# Patient Record
Sex: FEMALE | Race: OTHER | HISPANIC OR LATINO
[De-identification: names, ages, dates, MRNs, and addresses within clinical notes are randomized per-mention and may not be internally consistent; named-entity substitution may affect disease eponyms.]

---

## 2020-08-24 ENCOUNTER — APPOINTMENT (OUTPATIENT)
Dept: FAMILY MEDICINE | Facility: CLINIC | Age: 45
End: 2020-08-24
Payer: MEDICARE

## 2020-08-24 ENCOUNTER — NON-APPOINTMENT (OUTPATIENT)
Age: 45
End: 2020-08-24

## 2020-08-24 VITALS
HEART RATE: 81 BPM | BODY MASS INDEX: 30.75 KG/M2 | TEMPERATURE: 97.9 F | HEIGHT: 65 IN | DIASTOLIC BLOOD PRESSURE: 72 MMHG | WEIGHT: 184.6 LBS | SYSTOLIC BLOOD PRESSURE: 122 MMHG | OXYGEN SATURATION: 97 %

## 2020-08-24 VITALS — RESPIRATION RATE: 12 BRPM

## 2020-08-24 DIAGNOSIS — Z87.898 PERSONAL HISTORY OF OTHER SPECIFIED CONDITIONS: ICD-10-CM

## 2020-08-24 DIAGNOSIS — R94.31 ABNORMAL ELECTROCARDIOGRAM [ECG] [EKG]: ICD-10-CM

## 2020-08-24 DIAGNOSIS — Z86.59 PERSONAL HISTORY OF OTHER MENTAL AND BEHAVIORAL DISORDERS: ICD-10-CM

## 2020-08-24 DIAGNOSIS — F90.9 ATTENTION-DEFICIT HYPERACTIVITY DISORDER, UNSPECIFIED TYPE: ICD-10-CM

## 2020-08-24 DIAGNOSIS — F17.200 NICOTINE DEPENDENCE, UNSPECIFIED, UNCOMPLICATED: ICD-10-CM

## 2020-08-24 DIAGNOSIS — R01.1 CARDIAC MURMUR, UNSPECIFIED: ICD-10-CM

## 2020-08-24 DIAGNOSIS — Z80.1 FAMILY HISTORY OF MALIGNANT NEOPLASM OF TRACHEA, BRONCHUS AND LUNG: ICD-10-CM

## 2020-08-24 PROCEDURE — 36415 COLL VENOUS BLD VENIPUNCTURE: CPT

## 2020-08-24 PROCEDURE — 93000 ELECTROCARDIOGRAM COMPLETE: CPT

## 2020-08-24 PROCEDURE — G0439: CPT

## 2020-08-24 RX ORDER — TOPIRAMATE 50 MG/1
50 TABLET, COATED ORAL TWICE DAILY
Refills: 0 | Status: ACTIVE | COMMUNITY
Start: 2020-08-24

## 2020-08-24 NOTE — PHYSICAL EXAM
[Well Nourished] : well nourished [No Acute Distress] : no acute distress [Well Developed] : well developed [Normal Sclera/Conjunctiva] : normal sclera/conjunctiva [PERRL] : pupils equal round and reactive to light [No Accessory Muscle Use] : no accessory muscle use [No Respiratory Distress] : no respiratory distress  [Clear to Auscultation] : lungs were clear to auscultation bilaterally [Declined Breast Exam] : declined breast exam  [Declined Rectal Exam] : declined rectal exam [Normal Posterior Cervical Nodes] : no posterior cervical lymphadenopathy [Normal Anterior Cervical Nodes] : no anterior cervical lymphadenopathy [Normal] : no CVA or spinal tenderness [No Rash] : no rash [Normal Gait] : normal gait [Coordination Grossly Intact] : coordination grossly intact [Normal Affect] : the affect was normal [Normal Insight/Judgement] : insight and judgment were intact [de-identified] : reports CP in the past, denies today- referral to cardiology. SYSTOLIC MURMUR NOTED- reports also in childhood [de-identified] : referral to GYN

## 2020-08-24 NOTE — HISTORY OF PRESENT ILLNESS
[de-identified] : This is a 44y/o female here today for new patient visit to establish care. previous patient of Dr. Frank Amico about 1.5 years, no longer accepts insurance and "our office is closer". \par PMH includes anxiety and depression, reports taking Topamax 75mg bid. bipolar disorder, reports taking Seroquel 200mg HS, and ADD, reports taking Strattera 60mg daily; patient reports was previously managed by psychiatry, specialist since retired and patient was receiving med renewals from previous PCP. will provide referral for new psychiatrist. \par also in recovery from substance use (heroine) about 2 months clean. patient attends substance use counseling with [facility] in [facility] M/W/F, currently on Methadone; clinic provides Rx. reports currently taking Methadone 105mg daily as prescribed. denies concerns and reports tolerating well. \par PSH; laparoscopy check for ovarian cysts, o/w denies.\par family history; father's side unknown, lung ca, o/w denies. \par today denies concerns aside for need for new psychiatrist. denies other major accidents, injuries, illnesses, hospitalizations.  [FreeTextEntry1] : here for new patient visit

## 2020-08-24 NOTE — COUNSELING
[Behavioral health counseling provided] : Behavioral health counseling provided [Sleep ___ hours/day] : Sleep [unfilled] hours/day [Engage in a relaxing activity] : Engage in a relaxing activity [Plan in advance] : Plan in advance [Cessation strategies including cessation program discussed] : Cessation strategies including cessation program discussed [Risk of tobacco use and health benefits of smoking cessation discussed] : Risk of tobacco use and health benefits of smoking cessation discussed [Use of nicotine replacement therapies and other medications discussed] : Use of nicotine replacement therapies and other medications discussed [Encouraged to pick a quit date and identify support needed to quit] : Encouraged to pick a quit date and identify support needed to quit [None] : None [Good understanding] : Patient has a good understanding of lifestyle changes and steps needed to achieve self management goal [Willing to Quit Smoking] : Not willing to quit smoking [de-identified] : Maintain balanced diet of whole grain, fruits and vegetables, lean meats, skinless poultry, fish, beans, soy products, eggs, nuts, and low fat dairy as per FDA dietary guidelines. \par Avoid high calorie/low nutrient foods. \par vegetables/fruits- at least 5 servings/day, \par whole grains- 100% whole grain and at least 3 grams fiber/day.\par reduce/eliminate saturated fat- less than 5% on nutrition labels (ex. delgadillo, deli meat, hot dogs, fried foods, cookies, ice cream, chips, soft drinks, etc.)\par stay within your FDA recommended daily calorie needs or use the plate method to portion intake. \par Avoid fast food and limit eating out. When eating out choose healthy alternatives (ex. grilled, baked, steamed. low fat dressings. avoid french fries).\par DO NOT CONSUME FOODS YOU ARE ALLERGIC TO DESPITE DIETARY RECOMMENDATIONS.\par \par For more information you can visit www.Health.gov\par \par Incorporate regular physical activity most days of the week. \par Regular aerobic activity- moderate intensity, most days/wk, at least 30min/day- ex. brisk walk 2.5miles/hr, ballroom dancing, water aerobics, light yard work (raking/lawn mowing) actively playing basketball, biking 10miles/hr.\par Muscle strengthening and strength/resistance exercises 2-3days/wk- ex. free weights, resistance bands, your own weight (squats/pull-ups/push-ups).\par Neuro-motor exercises for balance/agility/coordination and flexibility exercises 2-3days/wk, 20-30min/day- ex. yoga and south-chi.\par Bone strengthening at least 30min/day, most days of the week- ex. weights, resistance bands, running, brisk walking, jumping rope, stair climbing, tennis.\par Decrease sedentary time. \par LISTEN TO YOUR BODY AND MODIFY ACTIVITY AS NEEDED- DO NOT OVEREXERT YOURSELF DURING PHYSICAL ACTIVITY DESPITE RECOMMENDATION.\par \par For more information you can visit www.Health.gov \par \par Depression/Anxiety are mood disorders. The symptoms of depression/anxiety can affect how you think and feel and how you handle every day activities (work, eating, sleeping). Multiple treatments are available such as psychotherapy/counseling, medications called antidepressants or anxiolytics, or other therapies. \par Some medications can take 2-4 weeks to work and can have side effects; notify our office if you experience any side effects. Suicidal thoughts or attempt may occur in some people, especially if agitated when first initiating medication, before it begins to work; if suicidal thoughts/ideations or suicidal attempt- call 911 for emergency services/go to the nearest emergency department. \par IF IN CRISIS YOU HAVE SUPPORT= CALL 911/EMERGENCY SERVICES, CALL [facility] [phone], CALL OUR OFFICE.

## 2020-08-24 NOTE — PLAN
[FreeTextEntry1] : labs checked today- f/u results\par ECG result- abnormal result in setting of h/o substance use disorder; f/u with cardiology as discussed. copy of ECG sent with patient for cardiology review and record keeping.\par f/u with psychiatry, dental, and GYN as discussed.\par continue f/u with [facility] as discussed for counseling and Methadone renewals as appropriate.\par patient to have all reports sent to our office for review and record keeping.\par no medications to renew. \par encouraged to notify office if worsening/persistent symptoms or new onset complaints/concerns, in the event of any emergency or life threatening condition, go to the nearest emergency department and then notify office. \par patient verbalized understanding and agrees with plan of care.

## 2020-08-24 NOTE — HEALTH RISK ASSESSMENT
[Good] : ~his/her~  mood as  good [] : Yes [16-20] : 16-20 [No] : No [No falls in past year] : Patient reported no falls in the past year [0] : 1) Little interest or pleasure doing things: Not at all (0) [No Retinopathy] : No retinopathy [Patient reported colonoscopy was abnormal] : Patient reported colonoscopy was abnormal [Patient reported PAP Smear was normal] : Patient reported PAP Smear was normal [Hepatitis C test offered] : Hepatitis C test offered [HIV Test offered] : HIV Test offered [None] : None [With Family] : lives with family [# of Members in Household ___] :  household currently consist of [unfilled] member(s) [Employed] : employed [College] : College [# Of Children ___] : has [unfilled] children [Significant Other] : lives with significant other [Sexually Active] : sexually active [Feels Safe at Home] : Feels safe at home [Fully functional (bathing, dressing, toileting, transferring, walking, feeding)] : Fully functional (bathing, dressing, toileting, transferring, walking, feeding) [Fully functional (using the telephone, shopping, preparing meals, housekeeping, doing laundry, using] : Fully functional and needs no help or supervision to perform IADLs (using the telephone, shopping, preparing meals, housekeeping, doing laundry, using transportation, managing medications and managing finances) [Reports normal functional visual acuity (ie: able to read med bottle)] : Reports normal functional visual acuity [Carbon Monoxide Detector] : carbon monoxide detector [Smoke Detector] : smoke detector [Safety elements used in home] : safety elements used in home [Seat Belt] :  uses seat belt [Sunscreen] : uses sunscreen [Patient/Caregiver not ready to engage] : Patient/Caregiver not ready to engage [de-identified] : daily physical activity [de-identified] : sober x2 months [de-identified] : tries to eat well balanced, likes sweets, some junk food.  [[id number]] : 0 [EyeExamDate] : 2020 [Language] : denies difficulty with language [Change in mental status noted] : No change in mental status noted [High Risk Behavior] : no high risk behavior [Reports changes in hearing] : Reports no changes in hearing [Reports changes in vision] : Reports no changes in vision [Reports changes in dental health] : Reports no changes in dental health [Guns at Home] : no guns at home [Travel to Developing Areas] : does not  travel to developing areas [TB Exposure] : is not being exposed to tuberculosis [Caregiver Concerns] : does not have caregiver concerns [MammogramComments] : referral [ColonoscopyDate] : 2017 [PapSmearDate] : 2016 [PapSmearComments] : referral to GYN [de-identified] : monogamous with significant other [de-identified] : lenses [ColonoscopyComments] : treated for h. pylori about 3 years ago, denies rec to f/u. [de-identified] : needs dental referral

## 2020-08-25 LAB
25(OH)D3 SERPL-MCNC: 30.9 NG/ML
ALBUMIN SERPL ELPH-MCNC: 5 G/DL
ALP BLD-CCNC: 85 U/L
ALT SERPL-CCNC: 15 U/L
ANION GAP SERPL CALC-SCNC: 15 MMOL/L
AST SERPL-CCNC: 12 U/L
BASOPHILS # BLD AUTO: 0.04 K/UL
BASOPHILS NFR BLD AUTO: 0.8 %
BILIRUB SERPL-MCNC: 0.4 MG/DL
BUN SERPL-MCNC: 14 MG/DL
CALCIUM SERPL-MCNC: 10.2 MG/DL
CHLORIDE SERPL-SCNC: 103 MMOL/L
CHOLEST SERPL-MCNC: 214 MG/DL
CHOLEST/HDLC SERPL: 4 RATIO
CO2 SERPL-SCNC: 24 MMOL/L
CREAT SERPL-MCNC: 0.8 MG/DL
EOSINOPHIL # BLD AUTO: 0.3 K/UL
EOSINOPHIL NFR BLD AUTO: 5.8 %
ESTIMATED AVERAGE GLUCOSE: 103 MG/DL
FERRITIN SERPL-MCNC: 101 NG/ML
FOLATE SERPL-MCNC: 7.6 NG/ML
GLUCOSE SERPL-MCNC: 98 MG/DL
HBA1C MFR BLD HPLC: 5.2 %
HCT VFR BLD CALC: 44 %
HCV AB SER QL: NONREACTIVE
HCV S/CO RATIO: 0.12 S/CO
HDLC SERPL-MCNC: 54 MG/DL
HGB BLD-MCNC: 13.7 G/DL
HIV1+2 AB SPEC QL IA.RAPID: NONREACTIVE
IMM GRANULOCYTES NFR BLD AUTO: 0.2 %
IRON SATN MFR SERPL: 20 %
IRON SERPL-MCNC: 61 UG/DL
IRON SERPL-MCNC: 62 UG/DL
LDLC SERPL CALC-MCNC: 141 MG/DL
LYMPHOCYTES # BLD AUTO: 1.26 K/UL
LYMPHOCYTES NFR BLD AUTO: 24.6 %
MAN DIFF?: NORMAL
MCHC RBC-ENTMCNC: 30.1 PG
MCHC RBC-ENTMCNC: 31.1 GM/DL
MCV RBC AUTO: 96.7 FL
MONOCYTES # BLD AUTO: 0.39 K/UL
MONOCYTES NFR BLD AUTO: 7.6 %
NEUTROPHILS # BLD AUTO: 3.13 K/UL
NEUTROPHILS NFR BLD AUTO: 61 %
PLATELET # BLD AUTO: 265 K/UL
POTASSIUM SERPL-SCNC: 4.6 MMOL/L
PROT SERPL-MCNC: 7.4 G/DL
RBC # BLD: 4.55 M/UL
RBC # FLD: 13.5 %
SODIUM SERPL-SCNC: 142 MMOL/L
T4 FREE SERPL-MCNC: 1.2 NG/DL
TIBC SERPL-MCNC: 308 UG/DL
TRIGL SERPL-MCNC: 96 MG/DL
TSH SERPL-ACNC: 0.37 UIU/ML
UIBC SERPL-MCNC: 246 UG/DL
VIT B12 SERPL-MCNC: 385 PG/ML
WBC # FLD AUTO: 5.13 K/UL

## 2020-12-08 ENCOUNTER — APPOINTMENT (OUTPATIENT)
Dept: FAMILY MEDICINE | Facility: CLINIC | Age: 45
End: 2020-12-08

## 2020-12-08 ENCOUNTER — APPOINTMENT (OUTPATIENT)
Dept: FAMILY MEDICINE | Facility: CLINIC | Age: 45
End: 2020-12-08
Payer: MEDICARE

## 2020-12-08 DIAGNOSIS — Z20.828 CONTACT WITH AND (SUSPECTED) EXPOSURE TO OTHER VIRAL COMMUNICABLE DISEASES: ICD-10-CM

## 2020-12-08 PROCEDURE — 99442: CPT

## 2020-12-08 NOTE — HISTORY OF PRESENT ILLNESS
[Home] : at home, [unfilled] , at the time of the visit. [Medical Office: ([facility])___] : at the medical office located in  [Mother] : mother [Verbal consent obtained from patient] : the patient, [unfilled] [Time Spent: ___ minutes] : I have spent [unfilled] minutes with the patient on the telephone

## 2021-04-07 ENCOUNTER — OUTPATIENT (OUTPATIENT)
Dept: OUTPATIENT SERVICES | Facility: HOSPITAL | Age: 46
LOS: 1 days | Discharge: HOME | End: 2021-04-07

## 2021-04-08 DIAGNOSIS — F11.20 OPIOID DEPENDENCE, UNCOMPLICATED: ICD-10-CM

## 2021-04-08 LAB
ALBUMIN SERPL ELPH-MCNC: 4.9 G/DL — SIGNIFICANT CHANGE UP (ref 3.3–5)
ALP SERPL-CCNC: 89 U/L — SIGNIFICANT CHANGE UP (ref 40–120)
ALT FLD-CCNC: 15 U/L — SIGNIFICANT CHANGE UP (ref 4–33)
ANION GAP SERPL CALC-SCNC: 10 MMOL/L — SIGNIFICANT CHANGE UP (ref 7–14)
APPEARANCE UR: ABNORMAL
AST SERPL-CCNC: 12 U/L — SIGNIFICANT CHANGE UP (ref 4–32)
BACTERIA # UR AUTO: ABNORMAL
BILIRUB SERPL-MCNC: 0.3 MG/DL — SIGNIFICANT CHANGE UP (ref 0.2–1.2)
BILIRUB UR-MCNC: NEGATIVE — SIGNIFICANT CHANGE UP
BUN SERPL-MCNC: 13 MG/DL — SIGNIFICANT CHANGE UP (ref 7–23)
CALCIUM SERPL-MCNC: 9.3 MG/DL — SIGNIFICANT CHANGE UP (ref 8.4–10.5)
CHLORIDE SERPL-SCNC: 101 MMOL/L — SIGNIFICANT CHANGE UP (ref 98–107)
CHOLEST SERPL-MCNC: 172 MG/DL — SIGNIFICANT CHANGE UP
CO2 SERPL-SCNC: 26 MMOL/L — SIGNIFICANT CHANGE UP (ref 22–31)
COLOR SPEC: YELLOW — SIGNIFICANT CHANGE UP
COMMENT - URINE: SIGNIFICANT CHANGE UP
CREAT SERPL-MCNC: 0.77 MG/DL — SIGNIFICANT CHANGE UP (ref 0.5–1.3)
DIFF PNL FLD: ABNORMAL
EPI CELLS # UR: SIGNIFICANT CHANGE UP
GLUCOSE SERPL-MCNC: 97 MG/DL — SIGNIFICANT CHANGE UP (ref 70–99)
GLUCOSE UR QL: NEGATIVE — SIGNIFICANT CHANGE UP
HAV IGG SER QL IA: REACTIVE
HBV CORE AB SER-ACNC: SIGNIFICANT CHANGE UP
HBV SURFACE AB SER-ACNC: REACTIVE
HBV SURFACE AG SER-ACNC: SIGNIFICANT CHANGE UP
HCG UR QL: NEGATIVE — SIGNIFICANT CHANGE UP
HCT VFR BLD CALC: 38.5 % — SIGNIFICANT CHANGE UP (ref 34.5–45)
HCV AB S/CO SERPL IA: 0.08 S/CO — SIGNIFICANT CHANGE UP (ref 0–0.99)
HCV AB SERPL-IMP: SIGNIFICANT CHANGE UP
HDLC SERPL-MCNC: 52 MG/DL — SIGNIFICANT CHANGE UP
HGB BLD-MCNC: 13.2 G/DL — SIGNIFICANT CHANGE UP (ref 11.5–15.5)
HIV 1+2 AB+HIV1 P24 AG SERPL QL IA: SIGNIFICANT CHANGE UP
KETONES UR-MCNC: NEGATIVE — SIGNIFICANT CHANGE UP
LEUKOCYTE ESTERASE UR-ACNC: ABNORMAL
LIPID PNL WITH DIRECT LDL SERPL: 99 MG/DL — SIGNIFICANT CHANGE UP
MCHC RBC-ENTMCNC: 30 PG — SIGNIFICANT CHANGE UP (ref 27–34)
MCHC RBC-ENTMCNC: 34.3 GM/DL — SIGNIFICANT CHANGE UP (ref 32–36)
MCV RBC AUTO: 87.5 FL — SIGNIFICANT CHANGE UP (ref 80–100)
NITRITE UR-MCNC: NEGATIVE — SIGNIFICANT CHANGE UP
NON HDL CHOLESTEROL: 120 MG/DL — SIGNIFICANT CHANGE UP
NRBC # BLD: 0 /100 WBCS — SIGNIFICANT CHANGE UP
NRBC # FLD: 0 K/UL — SIGNIFICANT CHANGE UP
PH UR: 8.5 — HIGH (ref 5–8)
PLATELET # BLD AUTO: 208 K/UL — SIGNIFICANT CHANGE UP (ref 150–400)
POTASSIUM SERPL-MCNC: 4 MMOL/L — SIGNIFICANT CHANGE UP (ref 3.5–5.3)
POTASSIUM SERPL-SCNC: 4 MMOL/L — SIGNIFICANT CHANGE UP (ref 3.5–5.3)
PROT SERPL-MCNC: 7.7 G/DL — SIGNIFICANT CHANGE UP (ref 6–8.3)
PROT UR-MCNC: ABNORMAL
RBC # BLD: 4.4 M/UL — SIGNIFICANT CHANGE UP (ref 3.8–5.2)
RBC # FLD: 12.3 % — SIGNIFICANT CHANGE UP (ref 10.3–14.5)
RBC CASTS # UR COMP ASSIST: 1 /HPF — SIGNIFICANT CHANGE UP (ref 0–4)
SODIUM SERPL-SCNC: 137 MMOL/L — SIGNIFICANT CHANGE UP (ref 135–145)
SP GR SPEC: 1.02 — SIGNIFICANT CHANGE UP (ref 1.01–1.02)
T PALLIDUM AB TITR SER: NEGATIVE — SIGNIFICANT CHANGE UP
TRIGL SERPL-MCNC: 107 MG/DL — SIGNIFICANT CHANGE UP
UROBILINOGEN FLD QL: SIGNIFICANT CHANGE UP
WBC # BLD: 5.85 K/UL — SIGNIFICANT CHANGE UP (ref 3.8–10.5)
WBC # FLD AUTO: 5.85 K/UL — SIGNIFICANT CHANGE UP (ref 3.8–10.5)
WBC UR QL: 5 /HPF — SIGNIFICANT CHANGE UP (ref 0–5)

## 2021-05-10 DIAGNOSIS — Z72.0 TOBACCO USE: ICD-10-CM

## 2021-05-10 DIAGNOSIS — F12.10 CANNABIS ABUSE, UNCOMPLICATED: ICD-10-CM

## 2021-05-10 DIAGNOSIS — F90.9 ATTENTION-DEFICIT HYPERACTIVITY DISORDER, UNSPECIFIED TYPE: ICD-10-CM

## 2021-05-10 DIAGNOSIS — F14.10 COCAINE ABUSE, UNCOMPLICATED: ICD-10-CM

## 2021-12-01 ENCOUNTER — APPOINTMENT (OUTPATIENT)
Dept: FAMILY MEDICINE | Facility: CLINIC | Age: 46
End: 2021-12-01

## 2021-12-06 ENCOUNTER — APPOINTMENT (OUTPATIENT)
Dept: FAMILY MEDICINE | Facility: CLINIC | Age: 46
End: 2021-12-06

## 2021-12-07 ENCOUNTER — APPOINTMENT (OUTPATIENT)
Dept: FAMILY MEDICINE | Facility: CLINIC | Age: 46
End: 2021-12-07
Payer: MEDICARE

## 2021-12-07 VITALS
WEIGHT: 181.8 LBS | TEMPERATURE: 97.2 F | BODY MASS INDEX: 30.25 KG/M2 | RESPIRATION RATE: 14 BRPM | OXYGEN SATURATION: 97 % | HEART RATE: 73 BPM

## 2021-12-07 PROCEDURE — 99214 OFFICE O/P EST MOD 30 MIN: CPT | Mod: 25

## 2021-12-07 NOTE — ASSESSMENT
[FreeTextEntry1] : Is no dictation for this 46-year-old female who is coming in today concerned with some hair loss the hair loss area is by the crown the patient states that she dyes her hair probably every 4-6 weeks and examining her scalp is only some area of his slight alopecia her about the size of the time in 2 spots that it does appear that there isn't renewed here growth in that area however I cautioned the patient has tried using hair dye treatments as often as she has she does she was quite concerned and said she will stop dying her hair and she puts the karyotype done I told her to try to 11 a pad as well to take tension off the hair follicles I am also performing a thyroid profile and a vitamin D level I told her take B complex vitamins and return to the office in about 4-6 weeks for further evaluation

## 2021-12-08 LAB
T3 SERPL-MCNC: 104 NG/DL
TSH SERPL-ACNC: 1.13 UIU/ML
VIT B12 SERPL-MCNC: 353 PG/ML

## 2022-01-06 ENCOUNTER — APPOINTMENT (OUTPATIENT)
Dept: FAMILY MEDICINE | Facility: CLINIC | Age: 47
End: 2022-01-06
Payer: MEDICARE

## 2022-01-06 VITALS
HEIGHT: 65 IN | HEART RATE: 77 BPM | OXYGEN SATURATION: 97 % | WEIGHT: 178 LBS | BODY MASS INDEX: 29.66 KG/M2 | TEMPERATURE: 97.8 F

## 2022-01-06 DIAGNOSIS — R59.1 GENERALIZED ENLARGED LYMPH NODES: ICD-10-CM

## 2022-01-06 PROCEDURE — 99214 OFFICE O/P EST MOD 30 MIN: CPT

## 2022-01-06 NOTE — HISTORY OF PRESENT ILLNESS
[FreeTextEntry1] : Pt is here to discuss about some lumps concerns. [de-identified] : lump in right axillary area x 1 week with discomfort in that area

## 2022-01-07 LAB
BASOPHILS # BLD AUTO: 0.02 K/UL
BASOPHILS NFR BLD AUTO: 0.5 %
EOSINOPHIL # BLD AUTO: 0.27 K/UL
EOSINOPHIL NFR BLD AUTO: 6.2 %
HCT VFR BLD CALC: 42.3 %
HGB BLD-MCNC: 14 G/DL
IMM GRANULOCYTES NFR BLD AUTO: 0.2 %
LYMPHOCYTES # BLD AUTO: 1.77 K/UL
LYMPHOCYTES NFR BLD AUTO: 40.3 %
MAN DIFF?: NORMAL
MCHC RBC-ENTMCNC: 30.1 PG
MCHC RBC-ENTMCNC: 33.1 GM/DL
MCV RBC AUTO: 91 FL
MONOCYTES # BLD AUTO: 0.36 K/UL
MONOCYTES NFR BLD AUTO: 8.2 %
NEUTROPHILS # BLD AUTO: 1.96 K/UL
NEUTROPHILS NFR BLD AUTO: 44.6 %
PLATELET # BLD AUTO: 209 K/UL
RBC # BLD: 4.65 M/UL
RBC # FLD: 12.6 %
WBC # FLD AUTO: 4.39 K/UL

## 2022-02-10 ENCOUNTER — APPOINTMENT (OUTPATIENT)
Dept: FAMILY MEDICINE | Facility: CLINIC | Age: 47
End: 2022-02-10
Payer: MEDICARE

## 2022-02-10 ENCOUNTER — NON-APPOINTMENT (OUTPATIENT)
Age: 47
End: 2022-02-10

## 2022-02-10 VITALS — OXYGEN SATURATION: 97 % | HEART RATE: 90 BPM | RESPIRATION RATE: 14 BRPM | TEMPERATURE: 98 F

## 2022-02-10 DIAGNOSIS — S59.909A UNSPECIFIED INJURY OF UNSPECIFIED ELBOW, INITIAL ENCOUNTER: ICD-10-CM

## 2022-02-10 DIAGNOSIS — S09.90XA UNSPECIFIED INJURY OF HEAD, INITIAL ENCOUNTER: ICD-10-CM

## 2022-02-10 PROCEDURE — 99214 OFFICE O/P EST MOD 30 MIN: CPT
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
## 2022-03-29 LAB
ALBUMIN SERPL ELPH-MCNC: 4.5 G/DL — SIGNIFICANT CHANGE UP (ref 3.3–5)
ALP SERPL-CCNC: 92 U/L — SIGNIFICANT CHANGE UP (ref 40–120)
ALT FLD-CCNC: 12 U/L — SIGNIFICANT CHANGE UP (ref 4–33)
ANION GAP SERPL CALC-SCNC: 11 MMOL/L — SIGNIFICANT CHANGE UP (ref 7–14)
AST SERPL-CCNC: 12 U/L — SIGNIFICANT CHANGE UP (ref 4–32)
BILIRUB SERPL-MCNC: 0.2 MG/DL — SIGNIFICANT CHANGE UP (ref 0.2–1.2)
BUN SERPL-MCNC: 16 MG/DL — SIGNIFICANT CHANGE UP (ref 7–23)
CALCIUM SERPL-MCNC: 9.5 MG/DL — SIGNIFICANT CHANGE UP (ref 8.4–10.5)
CHLORIDE SERPL-SCNC: 108 MMOL/L — HIGH (ref 98–107)
CHOLEST SERPL-MCNC: 177 MG/DL — SIGNIFICANT CHANGE UP
CO2 SERPL-SCNC: 25 MMOL/L — SIGNIFICANT CHANGE UP (ref 22–31)
CREAT SERPL-MCNC: 0.9 MG/DL — SIGNIFICANT CHANGE UP (ref 0.5–1.3)
EGFR: 80 ML/MIN/1.73M2 — SIGNIFICANT CHANGE UP
GLUCOSE SERPL-MCNC: 68 MG/DL — LOW (ref 70–99)
HCT VFR BLD CALC: 40.8 % — SIGNIFICANT CHANGE UP (ref 34.5–45)
HDLC SERPL-MCNC: 55 MG/DL — SIGNIFICANT CHANGE UP
HGB BLD-MCNC: 13.5 G/DL — SIGNIFICANT CHANGE UP (ref 11.5–15.5)
LIPID PNL WITH DIRECT LDL SERPL: 78 MG/DL — SIGNIFICANT CHANGE UP
MCHC RBC-ENTMCNC: 29.5 PG — SIGNIFICANT CHANGE UP (ref 27–34)
MCHC RBC-ENTMCNC: 33.1 GM/DL — SIGNIFICANT CHANGE UP (ref 32–36)
MCV RBC AUTO: 89.3 FL — SIGNIFICANT CHANGE UP (ref 80–100)
NON HDL CHOLESTEROL: 122 MG/DL — SIGNIFICANT CHANGE UP
NRBC # BLD: 0 /100 WBCS — SIGNIFICANT CHANGE UP
NRBC # FLD: 0 K/UL — SIGNIFICANT CHANGE UP
PLATELET # BLD AUTO: 237 K/UL — SIGNIFICANT CHANGE UP (ref 150–400)
POTASSIUM SERPL-MCNC: 4.6 MMOL/L — SIGNIFICANT CHANGE UP (ref 3.5–5.3)
POTASSIUM SERPL-SCNC: 4.6 MMOL/L — SIGNIFICANT CHANGE UP (ref 3.5–5.3)
PROT SERPL-MCNC: 7.1 G/DL — SIGNIFICANT CHANGE UP (ref 6–8.3)
RBC # BLD: 4.57 M/UL — SIGNIFICANT CHANGE UP (ref 3.8–5.2)
RBC # FLD: 12.9 % — SIGNIFICANT CHANGE UP (ref 10.3–14.5)
SODIUM SERPL-SCNC: 144 MMOL/L — SIGNIFICANT CHANGE UP (ref 135–145)
TRIGL SERPL-MCNC: 219 MG/DL — HIGH
WBC # BLD: 5.19 K/UL — SIGNIFICANT CHANGE UP (ref 3.8–10.5)
WBC # FLD AUTO: 5.19 K/UL — SIGNIFICANT CHANGE UP (ref 3.8–10.5)

## 2022-06-23 ENCOUNTER — OUTPATIENT (OUTPATIENT)
Dept: OUTPATIENT SERVICES | Facility: HOSPITAL | Age: 47
LOS: 1 days | Discharge: HOME | End: 2022-06-23

## 2022-06-24 DIAGNOSIS — F12.10 CANNABIS ABUSE, UNCOMPLICATED: ICD-10-CM

## 2022-06-24 DIAGNOSIS — F90.9 ATTENTION-DEFICIT HYPERACTIVITY DISORDER, UNSPECIFIED TYPE: ICD-10-CM

## 2022-06-24 DIAGNOSIS — F11.20 OPIOID DEPENDENCE, UNCOMPLICATED: ICD-10-CM

## 2022-06-24 DIAGNOSIS — F14.10 COCAINE ABUSE, UNCOMPLICATED: ICD-10-CM

## 2022-06-24 DIAGNOSIS — F31.9 BIPOLAR DISORDER, UNSPECIFIED: ICD-10-CM

## 2022-06-24 LAB
APPEARANCE UR: ABNORMAL
BACTERIA # UR AUTO: ABNORMAL
BILIRUB UR-MCNC: NEGATIVE — SIGNIFICANT CHANGE UP
COLOR SPEC: YELLOW — SIGNIFICANT CHANGE UP
DIFF PNL FLD: NEGATIVE — SIGNIFICANT CHANGE UP
EPI CELLS # UR: ABNORMAL
GLUCOSE UR QL: NEGATIVE — SIGNIFICANT CHANGE UP
HCG UR QL: NEGATIVE — SIGNIFICANT CHANGE UP
HYALINE CASTS # UR AUTO: 0 /LPF — SIGNIFICANT CHANGE UP (ref 0–7)
KETONES UR-MCNC: NEGATIVE — SIGNIFICANT CHANGE UP
LEUKOCYTE ESTERASE UR-ACNC: ABNORMAL
NITRITE UR-MCNC: NEGATIVE — SIGNIFICANT CHANGE UP
PH UR: 6.5 — SIGNIFICANT CHANGE UP (ref 5–8)
PROT UR-MCNC: ABNORMAL
RBC CASTS # UR COMP ASSIST: 1 /HPF — SIGNIFICANT CHANGE UP (ref 0–4)
SP GR SPEC: 1.02 — SIGNIFICANT CHANGE UP (ref 1–1.05)
UROBILINOGEN FLD QL: SIGNIFICANT CHANGE UP
WBC UR QL: 15 /HPF — HIGH (ref 0–5)

## 2022-07-01 LAB
HCV AB S/CO SERPL IA: 0.07 S/CO — SIGNIFICANT CHANGE UP (ref 0–0.99)
HCV AB SERPL-IMP: SIGNIFICANT CHANGE UP

## 2022-09-27 ENCOUNTER — EMERGENCY (EMERGENCY)
Facility: HOSPITAL | Age: 47
LOS: 1 days | Discharge: HOME | End: 2022-09-27
Attending: EMERGENCY MEDICINE
Payer: MEDICARE

## 2022-09-27 VITALS
TEMPERATURE: 98 F | OXYGEN SATURATION: 97 % | HEART RATE: 77 BPM | DIASTOLIC BLOOD PRESSURE: 76 MMHG | RESPIRATION RATE: 18 BRPM | SYSTOLIC BLOOD PRESSURE: 113 MMHG

## 2022-09-27 LAB — HCG UR QL: NEGATIVE — SIGNIFICANT CHANGE UP

## 2022-09-27 PROCEDURE — 99284 EMERGENCY DEPT VISIT MOD MDM: CPT

## 2022-09-27 PROCEDURE — 81025 URINE PREGNANCY TEST: CPT

## 2022-09-27 PROCEDURE — 73502 X-RAY EXAM HIP UNI 2-3 VIEWS: CPT

## 2022-09-27 PROCEDURE — 99283 EMERGENCY DEPT VISIT LOW MDM: CPT | Mod: 25

## 2022-09-27 PROCEDURE — 96372 THER/PROPH/DIAG INJ SC/IM: CPT

## 2022-09-27 RX ORDER — LIDOCAINE 4 G/100G
1 CREAM TOPICAL ONCE
Refills: 0 | Status: COMPLETED | OUTPATIENT
Start: 2022-09-27 | End: 2022-09-27

## 2022-09-27 RX ORDER — DIAZEPAM 5 MG
5 TABLET ORAL ONCE
Refills: 0 | Status: DISCONTINUED | OUTPATIENT
Start: 2022-09-27 | End: 2022-09-27

## 2022-09-27 RX ORDER — KETOROLAC TROMETHAMINE 30 MG/ML
30 SYRINGE (ML) INJECTION ONCE
Refills: 0 | Status: DISCONTINUED | OUTPATIENT
Start: 2022-09-27 | End: 2022-09-27

## 2022-09-27 RX ADMIN — Medication 5 MILLIGRAM(S): at 20:15

## 2022-09-27 RX ADMIN — LIDOCAINE 1 PATCH: 4 CREAM TOPICAL at 20:15

## 2022-09-27 RX ADMIN — Medication 30 MILLIGRAM(S): at 20:15

## 2022-09-27 NOTE — ED PROVIDER NOTE - PHYSICAL EXAMINATION
Const: Awake, alert and oriented. In no acute distress. Well appearing.  HEENT: NC/AT. Moist mucous membranes.  Eyes: No scleral icterus. EOMI.  Neck:. Soft and supple. Full ROM without pain.  Cardiac: +S1/S2. No murmurs. Peripheral pulses 2+ and symmetric. No LE edema.  Resp: Speaking in full sentences. No evidence of respiratory distress. No wheezes, rales or rhonchi.  Abd: Soft, non-tender, non-distended. Normal bowel sounds in all 4 quadrants. No guarding or rebound.  Back: Spine midline and non-tender. No CVAT.  MSK: Tenderness over right hip on palpation FROM in all extremities neurovasculary intact DP palpable   Skin: No rashes, abrasions or lacerations.  Lymph: No cervical lymphadenopathy.  Neuro: Awake, alert & oriented x 3. Moves all extremities symmetrically.

## 2022-09-27 NOTE — ED PROVIDER NOTE - CARE PROVIDER_API CALL
Vishal Avila)  Orthopaedic Surgery  [address]  [place] [address]  Phone: [phone]  Fax: [phone]  Follow Up Time:

## 2022-09-27 NOTE — ED PROVIDER NOTE - NS ED ATTENDING STATEMENT MOD
This was a shared visit with the MERT. I reviewed and verified the documentation and independently performed the documented:

## 2022-09-27 NOTE — ED ADULT NURSE NOTE - NSIMPLEMENTINTERV_GEN_ALL_ED
Implemented All Universal Safety Interventions:  [place] to call system. Call bell, personal items and telephone within reach. Instruct patient to call for assistance. Room bathroom lighting operational. Non-slip footwear when patient is off stretcher. Physically safe environment: no spills, clutter or unnecessary equipment. Stretcher in lowest position, wheels locked, appropriate side rails in place.
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
## 2022-09-27 NOTE — ED PROVIDER NOTE - PATIENT PORTAL LINK FT
You can access the FollowMyHealth Patient Portal offered by [facility] by registering at the following website: [email or web address]. By joining [facility]’s FollowMyHealth portal, you will also be able to view your health information using other applications (apps) compatible with our system.

## 2022-09-27 NOTE — ED PROVIDER NOTE - OBJECTIVE STATEMENT
pt is a 46 y/o female presenting to the ed for right hip pain for the past two days. pt denies injuries or trauma to the area, no recent heavy lifting or exercise. pt states pain worse with movement, does not radiate. pt denies taking medication at home for pain. pt denies cp sob fever abd pain nausea vomiting back pain numbness or loss of sensation rashes abrasions or lacerations urinary or fecal incontinence

## 2022-09-28 PROCEDURE — 73502 X-RAY EXAM HIP UNI 2-3 VIEWS: CPT | Mod: 26,RT

## 2022-12-24 ENCOUNTER — EMERGENCY (EMERGENCY)
Facility: HOSPITAL | Age: 47
LOS: 1 days | Discharge: HOME | End: 2022-12-24
Attending: EMERGENCY MEDICINE
Payer: MEDICARE

## 2022-12-24 VITALS
DIASTOLIC BLOOD PRESSURE: 79 MMHG | SYSTOLIC BLOOD PRESSURE: 110 MMHG | HEART RATE: 79 BPM | OXYGEN SATURATION: 99 % | WEIGHT: 184.97 LBS | RESPIRATION RATE: 18 BRPM | TEMPERATURE: 98 F

## 2022-12-24 LAB
FLUAV AG NPH QL: SIGNIFICANT CHANGE UP
FLUBV AG NPH QL: SIGNIFICANT CHANGE UP
RSV RNA NPH QL NAA+NON-PROBE: SIGNIFICANT CHANGE UP
SARS-COV-2 RNA SPEC QL NAA+PROBE: SIGNIFICANT CHANGE UP

## 2022-12-24 PROCEDURE — 87637 SARSCOV2&INF A&B&RSV AMP PRB: CPT

## 2022-12-24 PROCEDURE — 99284 EMERGENCY DEPT VISIT MOD MDM: CPT

## 2022-12-24 PROCEDURE — 99283 EMERGENCY DEPT VISIT LOW MDM: CPT

## 2022-12-24 RX ADMIN — Medication 100 MILLIGRAM(S): at 15:40

## 2022-12-24 NOTE — ED PROVIDER NOTE - PATIENT PORTAL LINK FT
You can access the FollowMyHealth Patient Portal offered by [facility] by registering at the following website: [email or web address]. By joining [facility]’s FollowMyHealth portal, you will also be able to view your health information using other applications (apps) compatible with our system.

## 2022-12-24 NOTE — ED PROVIDER NOTE - OBJECTIVE STATEMENT
47-year-old female hx substance abuse on methadone presents to the ER complaining of sore throat, nasal congestion, dry cough since this morning.  Also with fatigue.  Denies any fever, chills, vomiting, diarrhea.  Unsure of sick contacts.  Patient was sent in from [facility] for COVID swab.  Patient is fully vaccinated against COVID and got her flu shot as well

## 2022-12-24 NOTE — ED PROVIDER NOTE - PHYSICAL EXAMINATION
Gen: No acute distress, non toxic  HEENT: Mucous membranes moist, pink conjunctivae, EOMI. Pharynx clear, no erythema or exudates. No lymphadenopathy. TMs clear bilaterally. External ears normal.   CV: RRR, nl s1/s2.  Resp: CTAB, normal rate and effort  GI: Abdomen soft, NT, ND. No rebound, no guarding  : No CVAT  Neuro: A&O x 3, moving all 4 extremities  MSK: No spine or joint tenderness to palpation  Skin: No rashes. intact and perfused.

## 2022-12-24 NOTE — ED PROVIDER NOTE - NSFOLLOWUPINSTRUCTIONS_ED_ALL_ED_FT
Please take ibuprofen 600mg every 6 hours as needed for pain  Please take tylenol 650mg every 6hours as needed for pain  Take tessalon perles 100mg every 8 hours as needed for cough  Follow up with primary care doctor in 2-3 days  Return to ER for new or worsening symptoms    Viral Respiratory Infection    A viral respiratory infection is an illness that affects parts of the body used for breathing, like the lungs, nose, and throat. It is caused by a germ called a virus. Symptoms can include runny nose, coughing, sneezing, fatigue, body aches, sore throat, fever, or headache. Over the counter medicine can be used to manage the symptoms but the infection typically goes away on its own in 5 to 10 days.     SEEK IMMEDIATE MEDICAL CARE IF YOU HAVE ANY OF THE FOLLOWING SYMPTOMS: shortness of breath, chest pain, fever over 10 days, or lightheadedness/dizziness.

## 2022-12-24 NOTE — ED PROVIDER NOTE - CLINICAL SUMMARY MEDICAL DECISION MAKING FREE TEXT BOX
Pt with URi symptoms since this morning, afebrile well appearing  covid/flu/rsv negative likely other viral URI  pt requesting cough meds  dc home fu pcp

## 2023-02-02 PROBLEM — Z78.9 OTHER SPECIFIED HEALTH STATUS: Chronic | Status: ACTIVE | Noted: 2022-12-24

## 2023-02-16 ENCOUNTER — APPOINTMENT (OUTPATIENT)
Dept: FAMILY MEDICINE | Facility: CLINIC | Age: 48
End: 2023-02-16

## 2023-02-27 ENCOUNTER — APPOINTMENT (OUTPATIENT)
Dept: FAMILY MEDICINE | Facility: CLINIC | Age: 48
End: 2023-02-27
Payer: MEDICARE

## 2023-03-15 ENCOUNTER — APPOINTMENT (OUTPATIENT)
Dept: FAMILY MEDICINE | Facility: CLINIC | Age: 48
End: 2023-03-15
Payer: MEDICARE

## 2023-03-15 ENCOUNTER — NON-APPOINTMENT (OUTPATIENT)
Age: 48
End: 2023-03-15

## 2023-03-15 VITALS
HEART RATE: 72 BPM | WEIGHT: 212 LBS | SYSTOLIC BLOOD PRESSURE: 118 MMHG | OXYGEN SATURATION: 94 % | BODY MASS INDEX: 35.32 KG/M2 | TEMPERATURE: 98 F | DIASTOLIC BLOOD PRESSURE: 82 MMHG | HEIGHT: 65 IN

## 2023-03-15 DIAGNOSIS — Z00.00 ENCOUNTER FOR GENERAL ADULT MEDICAL EXAMINATION W/OUT ABNORMAL FINDINGS: ICD-10-CM

## 2023-03-15 DIAGNOSIS — Z11.1 ENCOUNTER FOR SCREENING FOR RESPIRATORY TUBERCULOSIS: ICD-10-CM

## 2023-03-15 DIAGNOSIS — Z23 ENCOUNTER FOR IMMUNIZATION: ICD-10-CM

## 2023-03-15 DIAGNOSIS — H92.01 OTALGIA, RIGHT EAR: ICD-10-CM

## 2023-03-15 DIAGNOSIS — F32.A ANXIETY DISORDER, UNSPECIFIED: ICD-10-CM

## 2023-03-15 DIAGNOSIS — H61.22 IMPACTED CERUMEN, LEFT EAR: ICD-10-CM

## 2023-03-15 DIAGNOSIS — F17.210 NICOTINE DEPENDENCE, CIGARETTES, UNCOMPLICATED: ICD-10-CM

## 2023-03-15 DIAGNOSIS — F41.9 ANXIETY DISORDER, UNSPECIFIED: ICD-10-CM

## 2023-03-15 PROCEDURE — 90715 TDAP VACCINE 7 YRS/> IM: CPT

## 2023-03-15 PROCEDURE — G0008: CPT | Mod: 59

## 2023-03-15 PROCEDURE — 90471 IMMUNIZATION ADMIN: CPT

## 2023-03-15 PROCEDURE — G0444 DEPRESSION SCREEN ANNUAL: CPT | Mod: 59

## 2023-03-15 PROCEDURE — 90686 IIV4 VACC NO PRSV 0.5 ML IM: CPT

## 2023-03-15 PROCEDURE — 36415 COLL VENOUS BLD VENIPUNCTURE: CPT

## 2023-03-15 PROCEDURE — 99406 BEHAV CHNG SMOKING 3-10 MIN: CPT | Mod: 25

## 2023-03-15 PROCEDURE — 99396 PREV VISIT EST AGE 40-64: CPT | Mod: 25

## 2023-03-15 RX ORDER — HYDROXYZINE HYDROCHLORIDE 50 MG/1
TABLET ORAL
Refills: 0 | Status: ACTIVE | COMMUNITY

## 2023-03-15 RX ORDER — FLUOXETINE HYDROCHLORIDE 40 MG/1
CAPSULE ORAL
Refills: 0 | Status: ACTIVE | COMMUNITY

## 2023-03-15 RX ORDER — ARIPIPRAZOLE 15 MG/1
15 TABLET ORAL
Refills: 0 | Status: ACTIVE | COMMUNITY

## 2023-03-15 RX ORDER — HYDROCORTISONE AND ACETIC ACID OTIC 20.75; 10.375 MG/ML; MG/ML
1-2 SOLUTION AURICULAR (OTIC)
Qty: 1 | Refills: 0 | Status: ACTIVE | COMMUNITY
Start: 2023-03-15 | End: 1900-01-01

## 2023-03-15 RX ORDER — ASPIRIN 81 MG
6.5 TABLET, DELAYED RELEASE (ENTERIC COATED) ORAL
Qty: 1 | Refills: 0 | Status: ACTIVE | COMMUNITY
Start: 2023-03-15 | End: 1900-01-01

## 2023-03-15 RX ORDER — ATOMOXETINE HYDROCHLORIDE 40 MG/1
40 CAPSULE ORAL
Refills: 0 | Status: ACTIVE | COMMUNITY
Start: 2020-08-24

## 2023-03-15 RX ORDER — METHADONE HYDROCHLORIDE 10 MG/5ML
10 SOLUTION ORAL
Refills: 0 | Status: ACTIVE | COMMUNITY
Start: 2020-08-24

## 2023-03-15 RX ORDER — CEPHALEXIN 500 MG/1
500 TABLET ORAL 3 TIMES DAILY
Qty: 30 | Refills: 0 | Status: DISCONTINUED | COMMUNITY
Start: 2022-01-06 | End: 2023-03-15

## 2023-03-15 RX ORDER — TOPIRAMATE 25 MG/1
25 TABLET, COATED ORAL TWICE DAILY
Refills: 0 | Status: DISCONTINUED | COMMUNITY
Start: 2020-08-24 | End: 2023-03-15

## 2023-03-15 RX ORDER — QUETIAPINE 200 MG/1
200 TABLET, FILM COATED ORAL
Refills: 0 | Status: DISCONTINUED | COMMUNITY
Start: 2020-08-24 | End: 2023-03-15

## 2023-03-15 NOTE — ASSESSMENT
[FreeTextEntry1] : CPE-routine lab work drawn today, will follow-up results.\par \par Screenings:\par Referral to local gynecologist to update cervical cancer screening Pap smear\par Mammogram order given today\par Due for repeat colonoscopy, referral to local GI given\par \par Immunizations:\par The ones and Tdap vaccines updated today.\par \par Anxiety and depression-medication management with psychiatry.  We will request records to confirm medications.  Verbally contracts to safety today.\par \par Right ear pain-Acetasol eardrops given, advised not to place anything in the ear.\par \par Left ear cerumen impaction-Debrox drops prescribed, advised flushing with warm water and to avoid placing foreign objects into the ear.\par \par Nicotine dependence-smoking cessation counseling given, she is currently in the precontemplative stage\par \par Preemployment paperwork completed today, titers and QuantiFERON drawn, will follow-up results and complete paperwork at that time.\par \par RTC as directed.

## 2023-03-15 NOTE — HISTORY OF PRESENT ILLNESS
[FreeTextEntry1] : pt. here for physical [de-identified] : Halie is a 46 yo F with hx of IVDA (heroine) on methadone, anxiety, bipolar disorder, depression, ADHD, who presents today for CPE and pre employment paperwork. She currently follows with a methadone clinic ([facility]) and is on methadone 120 mg daily. She states it has been 90 days since her last drug use. She was following with Dr. Nathan psychiatry but medication management is now with psychiatry at [facility]. she currently takes Strattera 40 mg daily, Topamax 50 mg daily, Abilify rounds once daily, gabapentin, and Prozac as well as hydroxyzine.  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  She would like her flu shot and updated Tdap today.\par \par She notes some right ear pressure.

## 2023-03-15 NOTE — PHYSICAL EXAM
[Normal] : no acute distress, well nourished, well developed and well-appearing [de-identified] : Left ear cerumen impaction, right ear external canal with mild erythema, no purulence, TMs intact

## 2023-03-15 NOTE — HEALTH RISK ASSESSMENT
[0] : 2) Feeling down, depressed, or hopeless: Not at all (0) [PHQ-2 Negative - No further assessment needed] : PHQ-2 Negative - No further assessment needed [HIV Test offered] : HIV Test offered [Hepatitis C test offered] : Hepatitis C test offered [Fully functional (bathing, dressing, toileting, transferring, walking, feeding)] : Fully functional (bathing, dressing, toileting, transferring, walking, feeding) [Fully functional (using the telephone, shopping, preparing meals, housekeeping, doing laundry, using] : Fully functional and needs no help or supervision to perform IADLs (using the telephone, shopping, preparing meals, housekeeping, doing laundry, using transportation, managing medications and managing finances) [Current] : Current [20 or more] : 20 or more [No] : No [Yes] : In the past 12 months have you used drugs other than those required for medical reasons? 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

## 2023-03-16 ENCOUNTER — NON-APPOINTMENT (OUTPATIENT)
Age: 48
End: 2023-03-16

## 2023-03-16 LAB
25(OH)D3 SERPL-MCNC: 26.5 NG/ML
ALBUMIN SERPL ELPH-MCNC: 4.9 G/DL
ALP BLD-CCNC: 103 U/L
ALT SERPL-CCNC: 27 U/L
ANION GAP SERPL CALC-SCNC: 12 MMOL/L
AST SERPL-CCNC: 20 U/L
BASOPHILS # BLD AUTO: 0.05 K/UL
BASOPHILS NFR BLD AUTO: 0.9 %
BILIRUB SERPL-MCNC: 0.3 MG/DL
BUN SERPL-MCNC: 17 MG/DL
CALCIUM SERPL-MCNC: 9.5 MG/DL
CHLORIDE SERPL-SCNC: 104 MMOL/L
CHOLEST SERPL-MCNC: 195 MG/DL
CO2 SERPL-SCNC: 27 MMOL/L
CREAT SERPL-MCNC: 0.71 MG/DL
EGFR: 105 ML/MIN/1.73M2
EOSINOPHIL # BLD AUTO: 0.26 K/UL
EOSINOPHIL NFR BLD AUTO: 4.7 %
ESTIMATED AVERAGE GLUCOSE: 105 MG/DL
GLUCOSE SERPL-MCNC: 104 MG/DL
HBA1C MFR BLD HPLC: 5.3 %
HCT VFR BLD CALC: 40.2 %
HDLC SERPL-MCNC: 49 MG/DL
HGB BLD-MCNC: 13.1 G/DL
HIV1+2 AB SPEC QL IA.RAPID: NONREACTIVE
IMM GRANULOCYTES NFR BLD AUTO: 0.2 %
LDLC SERPL CALC-MCNC: 114 MG/DL
LYMPHOCYTES # BLD AUTO: 2.14 K/UL
LYMPHOCYTES NFR BLD AUTO: 38.8 %
MAN DIFF?: NORMAL
MCHC RBC-ENTMCNC: 29.1 PG
MCHC RBC-ENTMCNC: 32.6 GM/DL
MCV RBC AUTO: 89.3 FL
MONOCYTES # BLD AUTO: 0.41 K/UL
MONOCYTES NFR BLD AUTO: 7.4 %
NEUTROPHILS # BLD AUTO: 2.65 K/UL
NEUTROPHILS NFR BLD AUTO: 48 %
NONHDLC SERPL-MCNC: 146 MG/DL
PLATELET # BLD AUTO: 260 K/UL
POTASSIUM SERPL-SCNC: 4.3 MMOL/L
PROT SERPL-MCNC: 7.6 G/DL
RBC # BLD: 4.5 M/UL
RBC # FLD: 13.7 %
SODIUM SERPL-SCNC: 142 MMOL/L
TRIGL SERPL-MCNC: 157 MG/DL
TSH SERPL-ACNC: 0.72 UIU/ML
WBC # FLD AUTO: 5.52 K/UL

## 2023-03-17 LAB
HCV AB SER QL: NONREACTIVE
HCV S/CO RATIO: 0.06 S/CO
MEV IGG FLD QL IA: 223 AU/ML
MEV IGG+IGM SER-IMP: POSITIVE
MUV AB SER-ACNC: POSITIVE
MUV IGG SER QL IA: 77.6 AU/ML
RUBV IGG FLD-ACNC: 3.4 INDEX
RUBV IGG SER-IMP: POSITIVE
VZV AB TITR SER: POSITIVE
VZV IGG SER IF-ACNC: 1737 INDEX

## 2023-03-20 LAB
M TB IFN-G BLD-IMP: NEGATIVE
QUANTIFERON TB PLUS MITOGEN MINUS NIL: >10 IU/ML
QUANTIFERON TB PLUS NIL: 0.01 IU/ML
QUANTIFERON TB PLUS TB1 MINUS NIL: 0 IU/ML
QUANTIFERON TB PLUS TB2 MINUS NIL: 0.01 IU/ML

## 2023-03-26 ENCOUNTER — EMERGENCY (EMERGENCY)
Facility: HOSPITAL | Age: 48
LOS: 1 days | Discharge: HOME | End: 2023-03-26
Attending: EMERGENCY MEDICINE
Payer: MEDICARE

## 2023-03-26 VITALS
TEMPERATURE: 98 F | WEIGHT: 214.07 LBS | SYSTOLIC BLOOD PRESSURE: 136 MMHG | OXYGEN SATURATION: 96 % | RESPIRATION RATE: 20 BRPM | DIASTOLIC BLOOD PRESSURE: 90 MMHG | HEART RATE: 101 BPM | HEIGHT: 65 IN

## 2023-03-26 PROCEDURE — 99283 EMERGENCY DEPT VISIT LOW MDM: CPT

## 2023-03-26 RX ORDER — METHADONE HYDROCHLORIDE 40 MG/1
40 TABLET ORAL ONCE
Refills: 0 | Status: DISCONTINUED | OUTPATIENT
Start: 2023-03-26 | End: 2023-03-26

## 2023-03-26 RX ORDER — METHADONE HYDROCHLORIDE 40 MG/1
120 TABLET ORAL ONCE
Refills: 0 | Status: DISCONTINUED | OUTPATIENT
Start: 2023-03-26 | End: 2023-03-26

## 2023-03-26 RX ADMIN — METHADONE HYDROCHLORIDE 40 MILLIGRAM(S): 40 TABLET ORAL at 09:39

## 2023-03-26 NOTE — ED PROVIDER NOTE - CLINICAL SUMMARY MEDICAL DECISION MAKING FREE TEXT BOX
pt is a 48 y/o female presenting to the ed for medication refill. pt states was recently admitted to [facility] detox program for benzo withdrawal. pt currently on methadone 120 mg states was unable to go to clinic today closed and get dose, usually given by [facility]. will give one dose of methadone today in the ed pt to follow up with clinic

## 2023-03-26 NOTE — ED PROVIDER NOTE - CROS ED SKIN ALL NEG
MARINA MAYS  : 1950 16:37:20  ACCOUNT:  [id number]  HOME PHONE:  [phone]  WORK PHONE:     Email  lab results and POC.    
negative...

## 2023-03-26 NOTE — ED PROVIDER NOTE - OBJECTIVE STATEMENT
pt is a 48 y/o female presenting to the ed for medication refill. pt states was recently admitted to [facility] detox program for benzo withdrawal. pt currently on methadone 120 mg states was unable to go to clinic today closed and get dose, usually given by [facility]. pt states needs dose in the ed  pt denies any other complaints  pt denies cp sob fever abd pain back pain nausea vomiting numbness or loss of sensation  pt denies drug or alcohol use today

## 2023-03-26 NOTE — ED ADULT NURSE NOTE - OBJECTIVE STATEMENT
Pt A&Ox . Pt presents to the ED for methadone refill states she was discharged from detox yesterday RR even and unlabored.  medicated as per EMAR

## 2023-03-26 NOTE — ED ADULT TRIAGE NOTE - CHIEF COMPLAINT QUOTE
Pt A&Ox4, NAD. Pt presents to the ED for methadone refill. Pt was discharged from detox yesterday. Breathing even and unlabored.

## 2023-03-26 NOTE — ED ADULT NURSE NOTE - NSFALLRSKASSESSTYPE_ED_ALL_ED
Subjective:       Patient ID: Jose Davis is a 73 y.o. male.    Chief Complaint: Post-op Evaluation    HPI 74 yo male s/p hernia repair with some swelling and soreness and urinary retention  Review of Systems   Constitutional: Negative.    HENT: Negative.    Eyes: Negative.    Respiratory: Negative.    Cardiovascular: Negative.    Gastrointestinal: Positive for abdominal pain.   Endocrine: Negative.    Genitourinary: Positive for difficulty urinating and scrotal swelling.   Musculoskeletal: Negative.    Skin: Negative.    Allergic/Immunologic: Negative.    Neurological: Negative.    Hematological: Negative.    Psychiatric/Behavioral: Negative.    All other systems reviewed and are negative.      Objective:      Physical Exam   Constitutional: He is oriented to person, place, and time. He appears well-developed and well-nourished.   HENT:   Head: Normocephalic and atraumatic.   Right Ear: External ear normal.   Left Ear: External ear normal.   Nose: Nose normal.   Mouth/Throat: Oropharynx is clear and moist.   Eyes: Conjunctivae and EOM are normal. Pupils are equal, round, and reactive to light.   Neck: Normal range of motion. Neck supple.   Cardiovascular: Normal rate, regular rhythm, normal heart sounds and intact distal pulses.   Pulmonary/Chest: Effort normal and breath sounds normal.   Abdominal: Soft. Bowel sounds are normal.   Genitourinary:         Musculoskeletal: Normal range of motion.   Neurological: He is alert and oriented to person, place, and time. He has normal reflexes.   Skin: Skin is warm and dry.   Psychiatric: He has a normal mood and affect. His behavior is normal. Thought content normal.   Vitals reviewed.      Assessment:       1. Unilateral inguinal hernia with obstruction and without gangrene, recurrence not specified      with post op urinary retention  Plan:       I will see him in 3 weeks      Initial (On Arrival)

## 2023-03-26 NOTE — ED PROVIDER NOTE - PATIENT PORTAL LINK FT
You can access the FollowMyHealth Patient Portal offered by [facility] by registering at the following website: [email or web address]. By joining [employer]’s FollowMyHealth portal, you will also be able to view your health information using other applications (apps) compatible with our system.

## 2023-04-11 ENCOUNTER — EMERGENCY (EMERGENCY)
Facility: HOSPITAL | Age: 48
LOS: 1 days | Discharge: HOME | End: 2023-04-11
Attending: EMERGENCY MEDICINE
Payer: MEDICARE

## 2023-04-11 VITALS
SYSTOLIC BLOOD PRESSURE: 106 MMHG | TEMPERATURE: 98 F | RESPIRATION RATE: 18 BRPM | OXYGEN SATURATION: 96 % | DIASTOLIC BLOOD PRESSURE: 68 MMHG | HEART RATE: 62 BPM

## 2023-04-11 VITALS
RESPIRATION RATE: 20 BRPM | TEMPERATURE: 98 F | OXYGEN SATURATION: 98 % | WEIGHT: 212.97 LBS | SYSTOLIC BLOOD PRESSURE: 108 MMHG | HEIGHT: 65 IN | DIASTOLIC BLOOD PRESSURE: 76 MMHG | HEART RATE: 73 BPM

## 2023-04-11 LAB
ALBUMIN SERPL ELPH-MCNC: 4.1 G/DL — SIGNIFICANT CHANGE UP (ref 3.3–5.2)
ALP SERPL-CCNC: 93 U/L — SIGNIFICANT CHANGE UP (ref 40–120)
ALT FLD-CCNC: 27 U/L — SIGNIFICANT CHANGE UP
ANION GAP SERPL CALC-SCNC: 11 MMOL/L — SIGNIFICANT CHANGE UP (ref 5–17)
APPEARANCE UR: CLEAR — SIGNIFICANT CHANGE UP
AST SERPL-CCNC: 28 U/L — SIGNIFICANT CHANGE UP
BACTERIA # UR AUTO: ABNORMAL
BASOPHILS # BLD AUTO: 0.03 K/UL — SIGNIFICANT CHANGE UP (ref 0–0.2)
BASOPHILS NFR BLD AUTO: 0.5 % — SIGNIFICANT CHANGE UP (ref 0–2)
BILIRUB SERPL-MCNC: <0.2 MG/DL — LOW (ref 0.4–2)
BILIRUB UR-MCNC: NEGATIVE — SIGNIFICANT CHANGE UP
BUN SERPL-MCNC: 13.1 MG/DL — SIGNIFICANT CHANGE UP (ref 8–20)
CALCIUM SERPL-MCNC: 9.3 MG/DL — SIGNIFICANT CHANGE UP (ref 8.4–10.5)
CHLORIDE SERPL-SCNC: 103 MMOL/L — SIGNIFICANT CHANGE UP (ref 96–108)
CO2 SERPL-SCNC: 26 MMOL/L — SIGNIFICANT CHANGE UP (ref 22–29)
COLOR SPEC: YELLOW — SIGNIFICANT CHANGE UP
CREAT SERPL-MCNC: 0.74 MG/DL — SIGNIFICANT CHANGE UP (ref 0.5–1.3)
DIFF PNL FLD: NEGATIVE — SIGNIFICANT CHANGE UP
EGFR: 100 ML/MIN/1.73M2 — SIGNIFICANT CHANGE UP
EOSINOPHIL # BLD AUTO: 0.36 K/UL — SIGNIFICANT CHANGE UP (ref 0–0.5)
EOSINOPHIL NFR BLD AUTO: 6.5 % — HIGH (ref 0–6)
EPI CELLS # UR: SIGNIFICANT CHANGE UP
GLUCOSE SERPL-MCNC: 88 MG/DL — SIGNIFICANT CHANGE UP (ref 70–99)
GLUCOSE UR QL: NEGATIVE MG/DL — SIGNIFICANT CHANGE UP
HCG SERPL-ACNC: <4 MIU/ML — SIGNIFICANT CHANGE UP
HCT VFR BLD CALC: 36.4 % — SIGNIFICANT CHANGE UP (ref 34.5–45)
HGB BLD-MCNC: 12 G/DL — SIGNIFICANT CHANGE UP (ref 11.5–15.5)
IMM GRANULOCYTES NFR BLD AUTO: 0.4 % — SIGNIFICANT CHANGE UP (ref 0–0.9)
KETONES UR-MCNC: ABNORMAL
LEUKOCYTE ESTERASE UR-ACNC: ABNORMAL
LYMPHOCYTES # BLD AUTO: 1.67 K/UL — SIGNIFICANT CHANGE UP (ref 1–3.3)
LYMPHOCYTES # BLD AUTO: 30.1 % — SIGNIFICANT CHANGE UP (ref 13–44)
MCHC RBC-ENTMCNC: 29.6 PG — SIGNIFICANT CHANGE UP (ref 27–34)
MCHC RBC-ENTMCNC: 33 GM/DL — SIGNIFICANT CHANGE UP (ref 32–36)
MCV RBC AUTO: 89.7 FL — SIGNIFICANT CHANGE UP (ref 80–100)
MONOCYTES # BLD AUTO: 0.38 K/UL — SIGNIFICANT CHANGE UP (ref 0–0.9)
MONOCYTES NFR BLD AUTO: 6.8 % — SIGNIFICANT CHANGE UP (ref 2–14)
NEUTROPHILS # BLD AUTO: 3.09 K/UL — SIGNIFICANT CHANGE UP (ref 1.8–7.4)
NEUTROPHILS NFR BLD AUTO: 55.7 % — SIGNIFICANT CHANGE UP (ref 43–77)
NITRITE UR-MCNC: NEGATIVE — SIGNIFICANT CHANGE UP
PH UR: 6 — SIGNIFICANT CHANGE UP (ref 5–8)
PLATELET # BLD AUTO: 214 K/UL — SIGNIFICANT CHANGE UP (ref 150–400)
POTASSIUM SERPL-MCNC: 4.6 MMOL/L — SIGNIFICANT CHANGE UP (ref 3.5–5.3)
POTASSIUM SERPL-SCNC: 4.6 MMOL/L — SIGNIFICANT CHANGE UP (ref 3.5–5.3)
PROT SERPL-MCNC: 6.9 G/DL — SIGNIFICANT CHANGE UP (ref 6.6–8.7)
PROT UR-MCNC: NEGATIVE — SIGNIFICANT CHANGE UP
RBC # BLD: 4.06 M/UL — SIGNIFICANT CHANGE UP (ref 3.8–5.2)
RBC # FLD: 13.8 % — SIGNIFICANT CHANGE UP (ref 10.3–14.5)
RBC CASTS # UR COMP ASSIST: NEGATIVE /HPF — SIGNIFICANT CHANGE UP (ref 0–4)
SODIUM SERPL-SCNC: 140 MMOL/L — SIGNIFICANT CHANGE UP (ref 135–145)
SP GR SPEC: 1.02 — SIGNIFICANT CHANGE UP (ref 1.01–1.02)
UROBILINOGEN FLD QL: 1 MG/DL
WBC # BLD: 5.55 K/UL — SIGNIFICANT CHANGE UP (ref 3.8–10.5)
WBC # FLD AUTO: 5.55 K/UL — SIGNIFICANT CHANGE UP (ref 3.8–10.5)
WBC UR QL: SIGNIFICANT CHANGE UP /HPF (ref 0–5)

## 2023-04-11 PROCEDURE — 96374 THER/PROPH/DIAG INJ IV PUSH: CPT | Mod: XU

## 2023-04-11 PROCEDURE — 87086 URINE CULTURE/COLONY COUNT: CPT

## 2023-04-11 PROCEDURE — 99284 EMERGENCY DEPT VISIT MOD MDM: CPT

## 2023-04-11 PROCEDURE — 74177 CT ABD & PELVIS W/CONTRAST: CPT | Mod: 26,MG

## 2023-04-11 PROCEDURE — 76856 US EXAM PELVIC COMPLETE: CPT | Mod: 26

## 2023-04-11 PROCEDURE — 74177 CT ABD & PELVIS W/CONTRAST: CPT | Mod: MG

## 2023-04-11 PROCEDURE — 85025 COMPLETE CBC W/AUTO DIFF WBC: CPT

## 2023-04-11 PROCEDURE — 81001 URINALYSIS AUTO W/SCOPE: CPT

## 2023-04-11 PROCEDURE — 76830 TRANSVAGINAL US NON-OB: CPT

## 2023-04-11 PROCEDURE — 80053 COMPREHEN METABOLIC PANEL: CPT

## 2023-04-11 PROCEDURE — 99284 EMERGENCY DEPT VISIT MOD MDM: CPT | Mod: 25

## 2023-04-11 PROCEDURE — 76856 US EXAM PELVIC COMPLETE: CPT

## 2023-04-11 PROCEDURE — 36415 COLL VENOUS BLD VENIPUNCTURE: CPT

## 2023-04-11 PROCEDURE — 84702 CHORIONIC GONADOTROPIN TEST: CPT

## 2023-04-11 PROCEDURE — G1004: CPT

## 2023-04-11 PROCEDURE — 76830 TRANSVAGINAL US NON-OB: CPT | Mod: 26

## 2023-04-11 RX ORDER — AZITHROMYCIN 500 MG/1
1 TABLET, FILM COATED ORAL
Qty: 6 | Refills: 0
Start: 2023-04-11 | End: 2023-04-15

## 2023-04-11 RX ORDER — METHOCARBAMOL 500 MG/1
2 TABLET, FILM COATED ORAL
Qty: 18 | Refills: 0
Start: 2023-04-11 | End: 2023-04-13

## 2023-04-11 RX ORDER — KETOROLAC TROMETHAMINE 30 MG/ML
15 SYRINGE (ML) INJECTION ONCE
Refills: 0 | Status: DISCONTINUED | OUTPATIENT
Start: 2023-04-11 | End: 2023-04-11

## 2023-04-11 RX ADMIN — Medication 15 MILLIGRAM(S): at 12:17

## 2023-04-11 RX ADMIN — Medication 15 MILLIGRAM(S): at 11:38

## 2023-04-11 NOTE — ED STATDOCS - CARE PROVIDER_API CALL
Garth Cha (DO)  Critical Care Medicine; Internal Medicine; Pulmonary Disease  [address] [facility], [address] [place] [address]  Phone: [phone]  Fax: [phone]  Follow Up Time: 7-10 Days    Cynthia Rivas)  Obstetrics and Gynecology  [address] [place] [address], [place] [address]  Phone: [phone]  Fax: [phone]  Follow Up Time: 7-10 Days

## 2023-04-11 NOTE — ED STATDOCS - OBJECTIVE STATEMENT
48 y/o female with PMHx of presents to the ED c/o right flank pain radiating into her right groin associated with dysuria. Pt states pain exacerbated by bending over as well. Pt denies hx of kidney stones. Pt denies numbness, tingling, weakness, trauma to back.

## 2023-04-11 NOTE — ED STATDOCS - PROVIDER TOKENS
PROVIDER:[TOKEN:[[id number]],FOLLOWUP:[7-10 Days]],PROVIDER:[TOKEN:[[id number]],FOLLOWUP:[7-10 Days]]

## 2023-04-11 NOTE — ED ADULT NURSE REASSESSMENT NOTE - NS ED NURSE REASSESS COMMENT FT1
Pt A&O x 4 comfortable, denies complaints at this time. Reports relief from pain. Nonslip footwear. Bed locked and in lowest position. Able to make needs known.

## 2023-04-11 NOTE — ED ADULT NURSE NOTE - OBJECTIVE STATEMENT
Pt A&O x 4 c/o R flank pain radiating to RLQ x 3 days. Denies hematuria or dysuria. Denies N/V. No chills. Denies hx of kidney stones. Abdomen soft and nontender. IV access obtained; pt medicated as prescribed.

## 2023-04-11 NOTE — ED STATDOCS - PATIENT PORTAL LINK FT
You can access the FollowMyHealth Patient Portal offered by [facility] by registering at the following website: [email or web address]. By joining [email or web address]’s FollowMyHealth portal, you will also be able to view your health information using other applications (apps) compatible with our system.

## 2023-04-11 NOTE — ED STATDOCS - PROGRESS NOTE DETAILS
Pt reports improvement of symptoms in the ED. Abdomen soft non tender. No CVAT  Labs reviewed and grossly unremarkable  UA with trace LE, no WBC's and most likely contaminated with epithelial cells.   Urine Cx: sent/pending    CT abdomen: without any acute finding but showing incidental findings of:  1) Nonspecific 1.2 cm right adrenal nodule  2) mild patchy infiltrate with nodular feature in the lingula and left lower lobe for which clinical correlation with pneumonia is recommended. 8mm groundglass density nodule in the left lower lobe may be part of the infectious process. Follow-up chest CT may be pursued in 6 weeks to ensure resolution.  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     Pt also requesting muscle relaxant for her back pain.   All pertinent results explained to patient, and results printed/handed out to patient.   Instructed to f/u with PCP for incidental finding of nonspecific 1.2 cm right adrenal nodule  Instructed to f/u with pulmonary clinic for left lower lobe groundglass nodule  Instructed to f/u with GYN clinic for her right sided 2.8 cm fibroid  Strict ED return precautions given if any new or worsening symptoms

## 2023-04-11 NOTE — ED STATDOCS - CARE PROVIDERS DIRECT ADDRESSES
[email or web address]

## 2023-04-11 NOTE — ED STATDOCS - CLINICAL SUMMARY MEDICAL DECISION MAKING FREE TEXT BOX
Pt presents to the ED c/o right flank pain radiating into right groin associated with urinary symptoms, will labs, UA, given tenderness will also obtain CT abdomen Pelvis

## 2023-04-11 NOTE — ED STATDOCS - NSFOLLOWUPINSTRUCTIONS_ED_ALL_ED_FT
Please take all medications as prescribed  Please follow up with PCP for incidental finding of nonspecific 1.2 cm right adrenal nodule  Please follow up with pulmonary clinic for left lower lobe groundglass nodule  Please follow up with GYN clinic for her right sided 2.8 cm fibroid  Return to the Emergency room  if you are experiencing any new or worsening symptoms    Abdominal Pain    Many things can cause abdominal pain. Many times, abdominal pain is not caused by a disease and will improve without treatment. Your health care provider will do a physical exam to determine if there is a dangerous cause of your pain; blood tests and imaging may help determine the cause of your pain. However, in many cases, no cause may be found and you may need further testing as an outpatient. Monitor your abdominal pain for any changes.     SEEK IMMEDIATE MEDICAL CARE IF YOU HAVE ANY OF THE FOLLOWING SYMPTOMS: worsening abdominal pain, uncontrollable vomiting, profuse diarrhea, inability to have bowel movements or pass gas, black or bloody stools, fever accompanying chest pain or back pain, or fainting. These symptoms may represent a serious problem that is an emergency. Do not wait to see if the symptoms will go away. Get medical help right away. Call 911 and do not drive yourself to the hospital.

## 2023-04-13 LAB
CULTURE RESULTS: SIGNIFICANT CHANGE UP
SPECIMEN SOURCE: SIGNIFICANT CHANGE UP

## 2023-04-21 ENCOUNTER — APPOINTMENT (OUTPATIENT)
Dept: FAMILY MEDICINE | Facility: CLINIC | Age: 48
End: 2023-04-21

## 2023-04-21 ENCOUNTER — APPOINTMENT (OUTPATIENT)
Dept: PULMONOLOGY | Facility: CLINIC | Age: 48
End: 2023-04-21

## 2023-05-02 ENCOUNTER — APPOINTMENT (OUTPATIENT)
Dept: FAMILY MEDICINE | Facility: CLINIC | Age: 48
End: 2023-05-02

## 2023-05-03 ENCOUNTER — APPOINTMENT (OUTPATIENT)
Dept: PULMONOLOGY | Facility: CLINIC | Age: 48
End: 2023-05-03

## 2023-07-17 NOTE — ED ADULT NURSE NOTE - NS ED NOTE ABUSE SUSPICION NEGLECT YN
Can we call + confirm patient was fasting 100% for these labs ( no coffee, etc) ? Some uncharacteristic elevations were present (were not present 5 months ago)    Thanks
Patient states he was 100% fasting with no coffee.
Recommend repeat in 1 week, please fast for 10+ hours no liquid other than water, no food, no supplements.    Thanks 
No

## 2023-09-21 ENCOUNTER — APPOINTMENT (OUTPATIENT)
Dept: FAMILY MEDICINE | Facility: CLINIC | Age: 48
End: 2023-09-21

## 2023-11-06 ENCOUNTER — NON-APPOINTMENT (OUTPATIENT)
Age: 48
End: 2023-11-06

## 2023-11-27 ENCOUNTER — NON-APPOINTMENT (OUTPATIENT)
Age: 48
End: 2023-11-27

## 2024-01-08 ENCOUNTER — NON-APPOINTMENT (OUTPATIENT)
Age: 49
End: 2024-01-08

## 2024-02-26 ENCOUNTER — EMERGENCY (EMERGENCY)
Facility: HOSPITAL | Age: 49
LOS: 1 days | Discharge: HOME | End: 2024-02-26
Attending: EMERGENCY MEDICINE
Payer: MEDICARE

## 2024-02-26 VITALS
HEART RATE: 88 BPM | TEMPERATURE: 98 F | SYSTOLIC BLOOD PRESSURE: 114 MMHG | WEIGHT: 196.21 LBS | DIASTOLIC BLOOD PRESSURE: 76 MMHG | OXYGEN SATURATION: 98 % | RESPIRATION RATE: 20 BRPM | HEIGHT: 65 IN

## 2024-02-26 LAB
ALBUMIN SERPL ELPH-MCNC: 4.5 G/DL — SIGNIFICANT CHANGE UP (ref 3.3–5.2)
ALP SERPL-CCNC: 83 U/L — SIGNIFICANT CHANGE UP (ref 40–120)
ALT FLD-CCNC: 14 U/L — SIGNIFICANT CHANGE UP
ANION GAP SERPL CALC-SCNC: 15 MMOL/L — SIGNIFICANT CHANGE UP (ref 5–17)
AST SERPL-CCNC: 17 U/L — SIGNIFICANT CHANGE UP
BASOPHILS # BLD AUTO: 0.03 K/UL — SIGNIFICANT CHANGE UP (ref 0–0.2)
BASOPHILS NFR BLD AUTO: 0.4 % — SIGNIFICANT CHANGE UP (ref 0–2)
BILIRUB SERPL-MCNC: 0.6 MG/DL — SIGNIFICANT CHANGE UP (ref 0.4–2)
BUN SERPL-MCNC: 11 MG/DL — SIGNIFICANT CHANGE UP (ref 8–20)
CALCIUM SERPL-MCNC: 9.5 MG/DL — SIGNIFICANT CHANGE UP (ref 8.4–10.5)
CHLORIDE SERPL-SCNC: 100 MMOL/L — SIGNIFICANT CHANGE UP (ref 96–108)
CO2 SERPL-SCNC: 24 MMOL/L — SIGNIFICANT CHANGE UP (ref 22–29)
CREAT SERPL-MCNC: 0.65 MG/DL — SIGNIFICANT CHANGE UP (ref 0.5–1.3)
EGFR: 109 ML/MIN/1.73M2 — SIGNIFICANT CHANGE UP
EOSINOPHIL # BLD AUTO: 0.07 K/UL — SIGNIFICANT CHANGE UP (ref 0–0.5)
EOSINOPHIL NFR BLD AUTO: 0.8 % — SIGNIFICANT CHANGE UP (ref 0–6)
GLUCOSE SERPL-MCNC: 111 MG/DL — HIGH (ref 70–99)
HCG SERPL-ACNC: <4 MIU/ML — SIGNIFICANT CHANGE UP
HCT VFR BLD CALC: 41.9 % — SIGNIFICANT CHANGE UP (ref 34.5–45)
HGB BLD-MCNC: 14.4 G/DL — SIGNIFICANT CHANGE UP (ref 11.5–15.5)
IMM GRANULOCYTES NFR BLD AUTO: 0.2 % — SIGNIFICANT CHANGE UP (ref 0–0.9)
LYMPHOCYTES # BLD AUTO: 1.98 K/UL — SIGNIFICANT CHANGE UP (ref 1–3.3)
LYMPHOCYTES # BLD AUTO: 23.8 % — SIGNIFICANT CHANGE UP (ref 13–44)
MCHC RBC-ENTMCNC: 30.1 PG — SIGNIFICANT CHANGE UP (ref 27–34)
MCHC RBC-ENTMCNC: 34.4 GM/DL — SIGNIFICANT CHANGE UP (ref 32–36)
MCV RBC AUTO: 87.7 FL — SIGNIFICANT CHANGE UP (ref 80–100)
MONOCYTES # BLD AUTO: 0.8 K/UL — SIGNIFICANT CHANGE UP (ref 0–0.9)
MONOCYTES NFR BLD AUTO: 9.6 % — SIGNIFICANT CHANGE UP (ref 2–14)
NEUTROPHILS # BLD AUTO: 5.42 K/UL — SIGNIFICANT CHANGE UP (ref 1.8–7.4)
NEUTROPHILS NFR BLD AUTO: 65.2 % — SIGNIFICANT CHANGE UP (ref 43–77)
PLATELET # BLD AUTO: 264 K/UL — SIGNIFICANT CHANGE UP (ref 150–400)
POTASSIUM SERPL-MCNC: 3.8 MMOL/L — SIGNIFICANT CHANGE UP (ref 3.5–5.3)
POTASSIUM SERPL-SCNC: 3.8 MMOL/L — SIGNIFICANT CHANGE UP (ref 3.5–5.3)
PROT SERPL-MCNC: 8 G/DL — SIGNIFICANT CHANGE UP (ref 6.6–8.7)
RBC # BLD: 4.78 M/UL — SIGNIFICANT CHANGE UP (ref 3.8–5.2)
RBC # FLD: 13.2 % — SIGNIFICANT CHANGE UP (ref 10.3–14.5)
SODIUM SERPL-SCNC: 139 MMOL/L — SIGNIFICANT CHANGE UP (ref 135–145)
WBC # BLD: 8.32 K/UL — SIGNIFICANT CHANGE UP (ref 3.8–10.5)
WBC # FLD AUTO: 8.32 K/UL — SIGNIFICANT CHANGE UP (ref 3.8–10.5)

## 2024-02-26 PROCEDURE — 70487 CT MAXILLOFACIAL W/DYE: CPT | Mod: 26,MC

## 2024-02-26 PROCEDURE — 99285 EMERGENCY DEPT VISIT HI MDM: CPT

## 2024-02-26 RX ORDER — ACETAMINOPHEN 500 MG
650 TABLET ORAL ONCE
Refills: 0 | Status: COMPLETED | OUTPATIENT
Start: 2024-02-26 | End: 2024-02-26

## 2024-02-26 RX ORDER — AMPICILLIN SODIUM AND SULBACTAM SODIUM 250; 125 MG/ML; MG/ML
3 INJECTION, POWDER, FOR SUSPENSION INTRAMUSCULAR; INTRAVENOUS ONCE
Refills: 0 | Status: COMPLETED | OUTPATIENT
Start: 2024-02-26 | End: 2024-02-27

## 2024-02-26 RX ORDER — AMPICILLIN SODIUM AND SULBACTAM SODIUM 250; 125 MG/ML; MG/ML
INJECTION, POWDER, FOR SUSPENSION INTRAMUSCULAR; INTRAVENOUS
Refills: 0 | Status: DISCONTINUED | OUTPATIENT
Start: 2024-02-27 | End: 2024-03-05

## 2024-02-26 RX ORDER — AMPICILLIN SODIUM AND SULBACTAM SODIUM 250; 125 MG/ML; MG/ML
3 INJECTION, POWDER, FOR SUSPENSION INTRAMUSCULAR; INTRAVENOUS EVERY 6 HOURS
Refills: 0 | Status: DISCONTINUED | OUTPATIENT
Start: 2024-02-27 | End: 2024-03-05

## 2024-02-26 RX ORDER — KETOROLAC TROMETHAMINE 30 MG/ML
30 SYRINGE (ML) INJECTION ONCE
Refills: 0 | Status: DISCONTINUED | OUTPATIENT
Start: 2024-02-26 | End: 2024-02-26

## 2024-02-26 RX ADMIN — Medication 650 MILLIGRAM(S): at 21:53

## 2024-02-26 RX ADMIN — Medication 30 MILLIGRAM(S): at 21:53

## 2024-02-26 NOTE — ED ADULT NURSE NOTE - OBJECTIVE STATEMENT
per pt, she had a tooth infection on the right canine on friday but noticed her right face was swollen on saturday. Since this morning pt noticed redness and swelling to her right eye and pain. Notable swelling of right side face and eye with redness and notable rash. AO4, NAD. ED workup.

## 2024-02-26 NOTE — ED PROVIDER NOTE - ATTENDING APP SHARED VISIT CONTRIBUTION OF CARE
This was a shared visit with MERT. I reviewed and verified the documentation and independently performed the documented history/exam/mdm.

## 2024-02-26 NOTE — ED ADULT NURSE REASSESSMENT NOTE - NS ED NURSE REASSESS COMMENT FT1
Assumed care of pt from Steph Casey RN at 2310. Pt is resting comfortably in stretcher. NAD. Pt is A&Ox4. Respirations are even and unlabored. Color is appropriate for race. Pt awaiting CT scan. Pt updated on plan of care.

## 2024-02-26 NOTE — ED PROVIDER NOTE - CLINICAL SUMMARY MEDICAL DECISION MAKING FREE TEXT BOX
odontogenic abcess +/- facial cellulitis, labs and imaging as ordered, anitbx begun; pending results, may require obsevation for antibx, vs ENT consult vs transfer for dental. odontogenic abcess +/- facial cellulitis, labs and imaging as ordered, anitbx begun; pending results, may require obsevation for antibx, vs ENT consult vs transfer for dental.    NICO Hurtado [id number]: Patient with two dental abscess with associated cellulitis requiring transfer to another facility.

## 2024-02-26 NOTE — ED PROVIDER NOTE - ENMT, MLM
Airway patent, Nasal mucosa clear; no trismus  teeth 4, 5, 6 with significant decay, +gumline abscess with purulence at region; +assoc significant swelling, erythem, and induration of face from right periorbital region extending to below right mandibular region

## 2024-02-26 NOTE — ED PROVIDER NOTE - OBJECTIVE STATEMENT
48-year-old female, history of bipolar depression, no history of diabetes not on any anticoagulation, presents with 3 days of right-sided facial pain and swelling.  Patient noted 1 week of right upper dental pain.  Now associated with a lesion in her mouth that is draining pus and extensive right-sided facial redness and swelling.  Denies fever.  Denies inability to open mouth.  Systemically, otherwise feels well.

## 2024-02-26 NOTE — ED ADULT NURSE NOTE - NSFALLUNIVINTERV_ED_ALL_ED
Bed/Stretcher in lowest position, wheels locked, appropriate side rails in place/Call bell, personal items and telephone in reach/Instruct patient to call for assistance before getting out of bed/chair/stretcher/Non-slip footwear applied when patient is off stretcher/[place] to call system/Physically safe environment - no spills, clutter or unnecessary equipment/Purposeful proactive rounding/Room/bathroom lighting operational, light cord in reach

## 2024-02-26 NOTE — ED ADULT NURSE NOTE - ED STAT RN HANDOFF DETAILS
Patient A&Ox4, in no acute distress being transferred to [facility] via [facility] for right sided dental abscess with cellulitis.  Airway patent, VSS.

## 2024-02-26 NOTE — ED PROVIDER NOTE - PROGRESS NOTE DETAILS
NICO Hurtado: Patient evaluated by intake physician. HPI/ROS/PE as noted above. Will follow up plan per intake physician and continue to assess patient.

## 2024-02-27 ENCOUNTER — EMERGENCY (EMERGENCY)
Facility: HOSPITAL | Age: 49
LOS: 1 days | Discharge: HOME | End: 2024-02-27
Attending: EMERGENCY MEDICINE | Admitting: EMERGENCY MEDICINE
Payer: MEDICARE

## 2024-02-27 VITALS
HEART RATE: 89 BPM | RESPIRATION RATE: 17 BRPM | SYSTOLIC BLOOD PRESSURE: 102 MMHG | TEMPERATURE: 99 F | DIASTOLIC BLOOD PRESSURE: 63 MMHG | OXYGEN SATURATION: 98 %

## 2024-02-27 VITALS
HEIGHT: 65 IN | OXYGEN SATURATION: 98 % | RESPIRATION RATE: 16 BRPM | SYSTOLIC BLOOD PRESSURE: 102 MMHG | HEART RATE: 65 BPM | DIASTOLIC BLOOD PRESSURE: 67 MMHG | TEMPERATURE: 98 F

## 2024-02-27 VITALS
SYSTOLIC BLOOD PRESSURE: 127 MMHG | DIASTOLIC BLOOD PRESSURE: 77 MMHG | HEART RATE: 81 BPM | RESPIRATION RATE: 16 BRPM | OXYGEN SATURATION: 99 % | TEMPERATURE: 98 F

## 2024-02-27 PROCEDURE — 96374 THER/PROPH/DIAG INJ IV PUSH: CPT | Mod: XU

## 2024-02-27 PROCEDURE — 99283 EMERGENCY DEPT VISIT LOW MDM: CPT

## 2024-02-27 PROCEDURE — 84702 CHORIONIC GONADOTROPIN TEST: CPT

## 2024-02-27 PROCEDURE — 99285 EMERGENCY DEPT VISIT HI MDM: CPT | Mod: 25

## 2024-02-27 PROCEDURE — 70487 CT MAXILLOFACIAL W/DYE: CPT | Mod: MC

## 2024-02-27 PROCEDURE — 85025 COMPLETE CBC W/AUTO DIFF WBC: CPT

## 2024-02-27 PROCEDURE — 36415 COLL VENOUS BLD VENIPUNCTURE: CPT

## 2024-02-27 PROCEDURE — 87040 BLOOD CULTURE FOR BACTERIA: CPT

## 2024-02-27 PROCEDURE — 80053 COMPREHEN METABOLIC PANEL: CPT

## 2024-02-27 PROCEDURE — 96375 TX/PRO/DX INJ NEW DRUG ADDON: CPT | Mod: XU

## 2024-02-27 RX ORDER — KETOROLAC TROMETHAMINE 30 MG/ML
15 SYRINGE (ML) INJECTION ONCE
Refills: 0 | Status: DISCONTINUED | OUTPATIENT
Start: 2024-02-27 | End: 2024-02-27

## 2024-02-27 RX ADMIN — AMPICILLIN SODIUM AND SULBACTAM SODIUM 200 GRAM(S): 250; 125 INJECTION, POWDER, FOR SUSPENSION INTRAMUSCULAR; INTRAVENOUS at 00:30

## 2024-02-27 RX ADMIN — Medication 15 MILLIGRAM(S): at 04:38

## 2024-02-27 NOTE — ED PROVIDER NOTE - PATIENT PORTAL LINK FT
You can access the FollowMyHealth Patient Portal offered by [facility] by registering at the following website: [email or web address]. By joining [employer]’s FollowMyHealth portal, you will also be able to view your health information using other applications (apps) compatible with our system.

## 2024-02-27 NOTE — ED ADULT TRIAGE NOTE - CHIEF COMPLAINT QUOTE
Patient is transferred to [facility] Emergency Department by [facility]( unit # [id number]) from [place]  for cellulitis of right eye for 2 days. # 20 S/L right arm. IV azithromycin given at 1100. IV toradol given at 2100. 2 abscess in right eye upon ct scan. NAD noted. Right cheek and eye noted to be red. Brought directly to intake 12.

## 2024-02-27 NOTE — ED PROVIDER NOTE - ATTENDING CONTRIBUTION TO CARE
HPI: 48-year-old female, history of bipolar depression, no history of diabetes not on any anticoagulation, presents as a transfer from [facility] for Dental evaluation for dental abscesses. Originally presented to [place] with 3 days of right-sided facial pain and swelling.  Patient noted 1 week of right upper dental pain.  Now associated with a lesion in her mouth that is draining pus and extensive right-sided facial redness and swelling.  Denies fever.  Denies inability to open mouth.  Systemically, otherwise feels well. Endorses some pain at this time. Denies fevers, chills, n/v/c/d, cp/sob, dysuria.    EXAM: Patient is well-appearing speaking full sentences.  Swelling to her right face.  Facial bones are intact and she has full range of motion of the mandibles.  There is asymmetry to the right side with tenderness to palpation.  Tenderness to percussion on the right upper gums laterally.  Speaking full sentences uvula is midline tongue is flat no brawny induration in the neck.  Full range of motion at neck.  No drooling or stridor.    MDM: 48-year-old female history of bipolar depression that was transferred from outside hospital for dental recommendations and evaluation secondary to likely abscesses seen on CT imaging.  Patient was already given antibiotics and outpatient.  Will consult dental and appreciate recommendations.

## 2024-02-27 NOTE — ED PROVIDER NOTE - DISPOSITION TYPE
CVA (cerebral vascular accident)    Dementia    Diabetes    Hypertension    Hyperthyroidism DISCHARGE

## 2024-02-27 NOTE — PROGRESS NOTE ADULT - SUBJECTIVE AND OBJECTIVE BOX
Patient is a 48y old  Female who presents with a chief complaint of right sided facial swelling. Dental consulted to upper right tooth pain & right sided facial swelling.    HPI: 48-year-old female, history of bipolar depression, no history of diabetes not on any anticoagulation, presents as a transfer from [facility] for Dental evaluation for dental abscesses. Originally presented to [place] with 3 days of right-sided facial pain and swelling.  Patient noted 1 week of right upper dental pain.  Now associated with a lesion in her mouth that is draining pus and extensive right-sided facial redness and swelling.  Denies fever.  Denies inability to open mouth.  Systemically, otherwise feels well. Endorses some pain at this time. Denies fevers, chills, n/v/c/d, cp/sob, dysuria.     At [place], had a CT max/face that showed "Acute dental abscess involving the right upper canine.  An adjacent 1.7 x 0.4 x 1.8 cm subperiosteal fluid collection along the  right side of the maxilla is compatible with phlegmon/abscess." Received IV unasyn last dose around midnight.    PAST MEDICAL & SURGICAL HISTORY:  No pertinent past medical history  No significant past surgical history    MEDICATIONS  (STANDING):   · methocarbamol 500 mg oral tablet: 2 tab(s) orally every 8 hours do not drive or operate heavy machinery while taking robaxin as it might cause drowsiness  · Zithromax 250 mg oral tablet: 1 tab(s) orally once a day take 2 tabs on day 1 and then take 1 tab once a day for day 2-5    Allergies  No Known Allergies    EOE:    Mandible: FROM             Facial bones and MOM: grossly intact             (-) trismus             (-) LAD             (+) swelling - mild right sided facial swelling             (+) asymmetry - mild due to right sided facial swelling             (+) palpation - mild discomfort upon palpation             (-) SOB             (-) dysphagia             (-) LOC    IOE:  permanent dentition: grossly intact with multiple carious teeth           labial/buccal mucosa: WNL           (+) percussion           (+) palpation           (+) swelling - upper right buccal vestibule    Radiographs: 1 PA taken of tooth #6 & assessed. CT taken at [place] also assessed.    CT FINDINGS:  There are multiple large dental caries in the upper teeth bilaterally and in the right lower teeth. There is a large periapical lucency involving the right canine, suspicious for acute dental abscess. There is an adjacent 1.7 x 0.4 x 1.8 cm subperiosteal fluid collection along the right side of the maxilla (3:69 and 4:63) is compatible with phlegmon/abscess. There is there is overlying soft tissue thickening and hyperemia. There is subcutaneous edema and skin thickening along the right cheek and jaw and in the right periorbital preseptal soft tissue edema, consistent with cellulitis. There is thickening of the right platysma with trace edema extending into the right submandibular space and into the submental space in the midline.    There is no maxillofacial bone or mandibular fracture..    There is moderate mucosal thickening in the right maxillary sinus. The remainder of the paranasal sinuses are clear.    The mastoids are clear bilaterally.    The optic globes are smooth and symmetric in contour. The retrobulbar and extraconal fat is well-preserved. The extraocular muscles are not enlarged or deviated. The superior ophthalmic veins are symmetrically enhancing.    There is an asymmetric cluster of mildly enlarged right level IIa lymph nodes, measuring up to 1.8 x 1.2 cm (3:35). Left level IIa lymph nodes measure up to approximately 1.3 x 0.9 cm (3:44). Hyperemic lymph nodes measure up to 6 mm short axis in the submental space (3:16) and submandibular spaces bilaterally (3:18 and 3:28) are probably reactive.    IMPRESSION:  Multiple large dental caries in the upper teeth bilaterally and in the right lower teeth.  Acute dental abscess involving the right upper canine.  An adjacent 1.7 x 0.4 x 1.8 cm subperiosteal fluid collection along the right side of the maxilla is compatible with phlegmon/abscess.  There is overlying cellulitis in the right cheek, right jaw and right periorbital preseptal soft tissues as discussed above.  There is no gross CT evidence of postseptal orbital cellulitis.    There is an asymmetric cluster of mildly enlarged right level IIa lymph nodes, measuring up to 1.8 x 1.2 cm. There are hyperemic lymph nodes in the submental and submandibular spaces, measuring up to 6 mm short axis. These lymph nodes are likely reactive to acute infection. Follow-up ultrasound of the neck soft tissues may be obtained in 3 months to evaluate for resolution.    ASSESSMENT: Vestibular swelling of #6 may be due to grossly decayed tooth #6, with associated PARL.    PROCEDURE:  Limited clinical and radiographic exam completed with patient’s verbal consent. Discussed findings with patient, all questions answered. Incision and drainage indicated. RBA discussed and all questions answered. Informed consent given verbally and written.     18% topical benzocaine applied and 2 carpules of 4% Articaine with epi 1:100,000 delivered via local infiltrations anterior and posterior of tooth #6 with changing of needles prior to each subsequent delivery. Profound anesthesia was achieved. 15 blade was used to make an incision buccally extending from the mesial aspect of tooth #6 posteriorly along the vestibule. Hemostat used to blunt dissect the fascial planes to bone. Slight hemopurluence appreciated, most likely due to patient reporting that the site started to drain after IV abx were adminstered. Incision was then irrigated with sterile saline. Attempted to place Penrose drain however incision was too shallow to permit adequate placement and suturing. POIG verbally and written. Prior to patient dismissal from dental ED, hemostasis was achieved and patient reported that she was doing well. Patient tolerated procedure well.    RECOMMENDATIONS:  1) Oral antibiotics and pain meds as per med team upon discharge  2) Dental F/U with outpatient dentist or [facility] Dental clinic [phone]) for definitive treatment of tooth #6.  3) Dental F/U with outpatient dentist for comprehensive treatment.    Rogers Werner DDS, #[id number]   Patient is a 48y old  Female who presents with a chief complaint of right sided facial swelling. Dental consulted to upper right tooth pain & right sided facial swelling.    HPI: 48-year-old female, history of bipolar depression, no history of diabetes not on any anticoagulation, presents as a transfer from [facility] for Dental evaluation for dental abscesses. Originally presented to [place] with 3 days of right-sided facial pain and swelling.  Patient noted 1 week of right upper dental pain.  Now associated with a lesion in her mouth that is draining pus and extensive right-sided facial redness and swelling.  Denies fever.  Denies inability to open mouth.  Systemically, otherwise feels well. Endorses some pain at this time. Denies fevers, chills, n/v/c/d, cp/sob, dysuria.     At [place], had a CT max/face that showed "Acute dental abscess involving the right upper canine.  An adjacent 1.7 x 0.4 x 1.8 cm subperiosteal fluid collection along the  right side of the maxilla is compatible with phlegmon/abscess." Received IV unasyn last dose around midnight.    Patient reports that upon receiving IV unasyn the swelling has improved drastically and that purulence was appreciated.    PAST MEDICAL & SURGICAL HISTORY:  No pertinent past medical history  No significant past surgical history    MEDICATIONS  (STANDING):   · methocarbamol 500 mg oral tablet: 2 tab(s) orally every 8 hours do not drive or operate heavy machinery while taking robaxin as it might cause drowsiness  · Zithromax 250 mg oral tablet: 1 tab(s) orally once a day take 2 tabs on day 1 and then take 1 tab once a day for day 2-5    Allergies  No Known Allergies    EOE:    Mandible: FROM             Facial bones and MOM: grossly intact             (-) trismus             (-) LAD             (+) swelling - mild right sided facial swelling             (+) asymmetry - mild due to right sided facial swelling             (+) palpation - mild discomfort upon palpation             (-) SOB             (-) dysphagia             (-) LOC    IOE:  permanent dentition: grossly intact with multiple carious teeth           labial/buccal mucosa: WNL           (+) percussion           (+) palpation           (+) swelling - upper right buccal vestibule    Radiographs: 1 PA taken of tooth #6 & assessed. CT taken at [place] also assessed.    CT FINDINGS:  There are multiple large dental caries in the upper teeth bilaterally and in the right lower teeth. There is a large periapical lucency involving the right canine, suspicious for acute dental abscess. There is an adjacent 1.7 x 0.4 x 1.8 cm subperiosteal fluid collection along the right side of the maxilla (3:69 and 4:63) is compatible with phlegmon/abscess. There is there is overlying soft tissue thickening and hyperemia. There is subcutaneous edema and skin thickening along the right cheek and jaw and in the right periorbital preseptal soft tissue edema, consistent with cellulitis. There is thickening of the right platysma with trace edema extending into the right submandibular space and into the submental space in the midline.    There is no maxillofacial bone or mandibular fracture..    There is moderate mucosal thickening in the right maxillary sinus. The remainder of the paranasal sinuses are clear.    The mastoids are clear bilaterally.    The optic globes are smooth and symmetric in contour. The retrobulbar and extraconal fat is well-preserved. The extraocular muscles are not enlarged or deviated. The superior ophthalmic veins are symmetrically enhancing.    There is an asymmetric cluster of mildly enlarged right level IIa lymph nodes, measuring up to 1.8 x 1.2 cm (3:35). Left level IIa lymph nodes measure up to approximately 1.3 x 0.9 cm (3:44). Hyperemic lymph nodes measure up to 6 mm short axis in the submental space (3:16) and submandibular spaces bilaterally (3:18 and 3:28) are probably reactive.    IMPRESSION:  Multiple large dental caries in the upper teeth bilaterally and in the right lower teeth.  Acute dental abscess involving the right upper canine.  An adjacent 1.7 x 0.4 x 1.8 cm subperiosteal fluid collection along the right side of the maxilla is compatible with phlegmon/abscess.  There is overlying cellulitis in the right cheek, right jaw and right periorbital preseptal soft tissues as discussed above.  There is no gross CT evidence of postseptal orbital cellulitis.    There is an asymmetric cluster of mildly enlarged right level IIa lymph nodes, measuring up to 1.8 x 1.2 cm. There are hyperemic lymph nodes in the submental and submandibular spaces, measuring up to 6 mm short axis. These lymph nodes are likely reactive to acute infection. Follow-up ultrasound of the neck soft tissues may be obtained in 3 months to evaluate for resolution.    ASSESSMENT: Vestibular swelling of #6 may be due to grossly decayed tooth #6, with associated PARL.    PROCEDURE:  Limited clinical and radiographic exam completed with patient’s verbal consent. Discussed findings with patient, all questions answered. Incision and drainage indicated. RBA discussed and all questions answered. Informed consent given verbally and written.     18% topical benzocaine applied and 2 carpules of 4% Articaine with epi 1:100,000 delivered via local infiltrations anterior and posterior of tooth #6 with changing of needles prior to each subsequent delivery. Profound anesthesia was achieved. 15 blade was used to make an incision buccally extending from the mesial aspect of tooth #6 posteriorly along the vestibule. Hemostat used to blunt dissect the fascial planes to bone. Slight hemopurluence appreciated, most likely due to patient reporting that the site started to drain after IV abx were adminstered. Incision was then irrigated with sterile saline. Attempted to place Penrose drain however incision was too shallow to permit adequate placement and suturing. POIG verbally and written. Prior to patient dismissal from dental ED, hemostasis was achieved and patient reported that she was doing well. Patient tolerated procedure well.    RECOMMENDATIONS:  1) Oral antibiotics and pain meds as per med team upon discharge  2) Dental F/U with outpatient dentist or [facility] Dental clinic [phone]) for definitive treatment of tooth #6.  3) Dental F/U with outpatient dentist for comprehensive treatment.    Rogers Werner DDS, #[id number]   Patient is a 48y old  Female who presents with a chief complaint of right sided facial swelling. Dental consulted to upper right tooth pain & right sided facial swelling.    HPI: 48-year-old female, history of bipolar depression, no history of diabetes not on any anticoagulation, presents as a transfer from [facility] for Dental evaluation for dental abscesses. Originally presented to [place] with 3 days of right-sided facial pain and swelling.  Patient noted 1 week of right upper dental pain.  Now associated with a lesion in her mouth that is draining pus and extensive right-sided facial redness and swelling.  Denies fever.  Denies inability to open mouth.  Systemically, otherwise feels well. Endorses some pain at this time. Denies fevers, chills, n/v/c/d, cp/sob, dysuria.     At [place], had a CT max/face that showed "Acute dental abscess involving the right upper canine.  An adjacent 1.7 x 0.4 x 1.8 cm subperiosteal fluid collection along the  right side of the maxilla is compatible with phlegmon/abscess." Received IV unasyn last dose around midnight.    Patient reports that upon receiving IV unasyn the swelling has improved drastically and that purulence was appreciated.    PAST MEDICAL & SURGICAL HISTORY:  No pertinent past medical history  No significant past surgical history    MEDICATIONS  (STANDING):   · methocarbamol 500 mg oral tablet: 2 tab(s) orally every 8 hours do not drive or operate heavy machinery while taking robaxin as it might cause drowsiness  · Zithromax 250 mg oral tablet: 1 tab(s) orally once a day take 2 tabs on day 1 and then take 1 tab once a day for day 2-5    Allergies  No Known Allergies    EOE:    Mandible: FROM             Facial bones and MOM: grossly intact             (-) trismus             (-) LAD             (+) swelling - mild right sided facial swelling             (+) asymmetry - mild due to right sided facial swelling             (+) palpation - mild discomfort upon palpation             (-) SOB             (-) dysphagia             (-) LOC    IOE:  permanent dentition: grossly intact with multiple carious teeth           labial/buccal mucosa: WNL           (+) percussion           (+) palpation           (+) swelling - upper right buccal vestibule    Radiographs: 1 PA taken of tooth #6 & assessed. CT taken at [place] also assessed.    CT FINDINGS:  There are multiple large dental caries in the upper teeth bilaterally and in the right lower teeth. There is a large periapical lucency involving the right canine, suspicious for acute dental abscess. There is an adjacent 1.7 x 0.4 x 1.8 cm subperiosteal fluid collection along the right side of the maxilla (3:69 and 4:63) is compatible with phlegmon/abscess. There is there is overlying soft tissue thickening and hyperemia. There is subcutaneous edema and skin thickening along the right cheek and jaw and in the right periorbital preseptal soft tissue edema, consistent with cellulitis. There is thickening of the right platysma with trace edema extending into the right submandibular space and into the submental space in the midline.    There is no maxillofacial bone or mandibular fracture..    There is moderate mucosal thickening in the right maxillary sinus. The remainder of the paranasal sinuses are clear.    The mastoids are clear bilaterally.    The optic globes are smooth and symmetric in contour. The retrobulbar and extraconal fat is well-preserved. The extraocular muscles are not enlarged or deviated. The superior ophthalmic veins are symmetrically enhancing.    There is an asymmetric cluster of mildly enlarged right level IIa lymph nodes, measuring up to 1.8 x 1.2 cm (3:35). Left level IIa lymph nodes measure up to approximately 1.3 x 0.9 cm (3:44). Hyperemic lymph nodes measure up to 6 mm short axis in the submental space (3:16) and submandibular spaces bilaterally (3:18 and 3:28) are probably reactive.    IMPRESSION:  Multiple large dental caries in the upper teeth bilaterally and in the right lower teeth.  Acute dental abscess involving the right upper canine.  An adjacent 1.7 x 0.4 x 1.8 cm subperiosteal fluid collection along the right side of the maxilla is compatible with phlegmon/abscess.  There is overlying cellulitis in the right cheek, right jaw and right periorbital preseptal soft tissues as discussed above.  There is no gross CT evidence of postseptal orbital cellulitis.    There is an asymmetric cluster of mildly enlarged right level IIa lymph nodes, measuring up to 1.8 x 1.2 cm. There are hyperemic lymph nodes in the submental and submandibular spaces, measuring up to 6 mm short axis. These lymph nodes are likely reactive to acute infection. Follow-up ultrasound of the neck soft tissues may be obtained in 3 months to evaluate for resolution.    ASSESSMENT: Vestibular swelling of #6 may be due to grossly decayed tooth #6, with associated PARL.    PROCEDURE:  Limited clinical and radiographic exam completed with patient’s verbal consent. Discussed findings with patient, all questions answered. Incision and drainage indicated. RBA discussed and all questions answered. Informed consent given verbally and written.     18% topical benzocaine applied and 2 carpules of 4% Articaine with epi 1:100,000 delivered via local infiltrations anterior and posterior of tooth #6 with changing of needles prior to each subsequent delivery. Profound anesthesia was achieved. 15 blade was used to make an incision buccally extending from the mesial aspect of tooth #6 posteriorly along the vestibule. Hemostat used to blunt dissect the fascial planes to bone. Slight hemopurluence appreciated, most likely due to patient reporting that the site started to drain after IV abx were adminstered. Incision was then irrigated with sterile saline. Attempted to place Penrose drain however incision was too shallow to permit adequate placement and suturing. POIG verbally and written. Prior to patient dismissal from dental ED, hemostasis was achieved and patient reported that she was doing well. Patient tolerated procedure well.    RECOMMENDATIONS:  1) Oral antibiotics and pain meds as per med team upon discharge  2) Dental F/U with outpatient dentist or [facility] Dental clinic ([phone]) for definitive treatment of tooth #6.  3) Dental F/U with outpatient dentist for comprehensive treatment.    Rogers Werner DDS, #[id number]

## 2024-02-27 NOTE — ED PROVIDER NOTE - PHYSICAL EXAMINATION
Vital Signs Stable  Gen: well appearing, NAD  HEENT: no conjunctivitis, poor dentition  Cardiac: regular rate rhythm, normal S1S2  Chest: unlabored breathing  Abdomen: normal BS, soft, NT  Extremity: no gross deformity, good perfusion  Skin: no rash  Neuro: grossly normal

## 2024-02-27 NOTE — ED ADULT NURSE NOTE - NSFALLUNIVINTERV_ED_ALL_ED
Bed/Stretcher in lowest position, wheels locked, appropriate side rails in place/Call bell, personal items and telephone in reach/Instruct patient to call for assistance before getting out of bed/chair/stretcher/Non-slip footwear applied when patient is off stretcher/[place] to call system/Physically safe environment - no spills, clutter or unnecessary equipment/Purposeful proactive rounding/Room/bathroom lighting operational, light cord in reach

## 2024-02-27 NOTE — ED ADULT NURSE NOTE - OBJECTIVE STATEMENT
Patient received in intake. A&O4. ambulatory. Transfer from [facility] for cellulitis of right eye. States pain and swelling has started 2 days ago with no relief. Respirations even and unlabored. Denies SOB, chest pain, N/V/D,fevers. Patient with no vision loss, CP, blurred vision, SOB, N/V/D,fevers. patient received with 20 on right forearm. Medicated per MD orders. Stretcher in lowest position. Call bell within reach.

## 2024-02-27 NOTE — ED ADULT NURSE NOTE - CHIEF COMPLAINT QUOTE
Patient is transferred to [facility] Emergency Department by [facility]( unit # [id number]) from [place]  for cellulitis of right eye for 2 days. # 20 S/L right arm. IV azithromycin given at 1100. IV toradol given at 2100. 2 abscess in right eye upon ct scan. NAD noted. Right cheek and eye noted to be red. Brought directly to intake 12.

## 2024-02-27 NOTE — ED PROVIDER NOTE - CLINICAL SUMMARY MEDICAL DECISION MAKING FREE TEXT BOX
48-year-old female with PMH bipolar and anxiety and depression presents as a transfer from [place] for dental evaluation for dental abscesses.  Patient had a CT maxillofacial showing acute dental abscesses.  Patient got IV Unasyn around 12:30 AM.  Will give analgesia and consult dental.  Dispo pending dental recommendations.

## 2024-02-27 NOTE — ED PROVIDER NOTE - OBJECTIVE STATEMENT
48-year-old female, history of bipolar depression, no history of diabetes not on any anticoagulation, presents as a transfer from [facility] for Dental evaluation for dental abscesses. Originally presented to [place] with 3 days of right-sided facial pain and swelling.  Patient noted 1 week of right upper dental pain.  Now associated with a lesion in her mouth that is draining pus and extensive right-sided facial redness and swelling.  Denies fever.  Denies inability to open mouth.  Systemically, otherwise feels well. Endorses some pain at this time. Denies fevers, chills, n/v/c/d, cp/sob, dysuria.    At [place], had a CT max/face that showed "Acute dental abscess involving the right upper canine.  An adjacent 1.7 x 0.4 x 1.8 cm subperiosteal fluid collection along the   right side of the maxilla is compatible with phlegmon/abscess." Received IV unasyn last dose around midnight.

## 2024-02-27 NOTE — CHART NOTE - NSCHARTNOTEFT_GEN_A_CORE
informed by provider that patient is discharged and needs assistance with getting a taxi home.  confirmed patient’s address, [address] [address], and phone number, [phone].       scheduled taxi through [facility], pick-up at 9:45am by [employer], [phone]; ref # [id number].  informed patient and provider of above. No further social work intervention needed.

## 2024-02-27 NOTE — ED PROVIDER NOTE - NSFOLLOWUPINSTRUCTIONS_ED_ALL_ED_FT
You were evaluated in the emergency department for a dental abscess.  You were seen by our dental team.  You have been prescribed antibiotics for 7 days.  Please  and take as directed.  You can follow-up with your dentist or the dental clinic in 1 week. You were evaluated in the emergency department for a dental abscess.  You were seen by our dental team.  You have been prescribed antibiotics for 7 days.  Please  and take as directed.  You can follow-up with your dentist or the dental clinic in 1 week. Call [phone] to make an appointment. See your primary doctor within in 1 week for follow up.    You may take 975 mg Tylenol (acetaminophen) every six hours as needed for pain.    Abscess    An abscess is an infected area that contains a collection of pus and debris. It can occur in almost any part of the body and occurs when the tissue gets infection. Symptoms include a painful mass that is red, warm, tender that might break open and HAVE drainage. If your health care provider gave you antibiotics make sure to take the full course and do not stop even if feeling better.     SEEK IMMEDIATE MEDICAL CARE IF YOU HAVE ANY OF THE FOLLOWING SYMPTOMS: chills, fever, muscle aches, or red streaking from the area.    Rest, drink plenty of fluids.  Advance activity as tolerated.  Continue all previously prescribed medications as directed.  Follow up with your primary care physician in 48-72 hours- bring copies of your results.  Return to the ER for worsening or persistent symptoms, and/or ANY NEW OR CONCERNING SYMPTOMS. If you have issues obtaining follow up, please call: [phone] to obtain a doctor or specialist who takes your insurance in your area.

## 2024-03-03 LAB
CULTURE RESULTS: SIGNIFICANT CHANGE UP
CULTURE RESULTS: SIGNIFICANT CHANGE UP
SPECIMEN SOURCE: SIGNIFICANT CHANGE UP
SPECIMEN SOURCE: SIGNIFICANT CHANGE UP

## 2024-03-20 ENCOUNTER — APPOINTMENT (OUTPATIENT)
Dept: FAMILY MEDICINE | Facility: CLINIC | Age: 49
End: 2024-03-20

## 2025-02-14 ENCOUNTER — NON-APPOINTMENT (OUTPATIENT)
Age: 50
End: 2025-02-14

## 2025-07-16 NOTE — ED ADULT NURSE NOTE - CAS EDN DISCHARGE ASSESSMENT
Attending and PA/NP shared services statement (NON-critical care): Attending and PA/NP shared services statement (NON-critical care): Attending and PA/NP shared services statement (NON-critical care): Alert and oriented to person, place and time